# Patient Record
Sex: MALE | Race: WHITE | NOT HISPANIC OR LATINO | Employment: OTHER | URBAN - METROPOLITAN AREA
[De-identification: names, ages, dates, MRNs, and addresses within clinical notes are randomized per-mention and may not be internally consistent; named-entity substitution may affect disease eponyms.]

---

## 2022-01-01 ENCOUNTER — TELEPHONE (OUTPATIENT)
Dept: CARDIOLOGY | Facility: CLINIC | Age: 67
End: 2022-01-01

## 2022-01-01 ENCOUNTER — VIRTUAL VISIT (OUTPATIENT)
Dept: CARDIOLOGY | Facility: CLINIC | Age: 67
End: 2022-01-01
Attending: PHYSICIAN ASSISTANT
Payer: COMMERCIAL

## 2022-01-01 ENCOUNTER — TRANSCRIBE ORDERS (OUTPATIENT)
Dept: OTHER | Age: 67
End: 2022-01-01

## 2022-01-01 ENCOUNTER — TRANSFERRED RECORDS (OUTPATIENT)
Dept: HEALTH INFORMATION MANAGEMENT | Facility: CLINIC | Age: 67
End: 2022-01-01

## 2022-01-01 DIAGNOSIS — I27.20 PULMONARY HYPERTENSION, UNSPECIFIED (H): ICD-10-CM

## 2022-01-01 DIAGNOSIS — I27.20 PULMONARY HYPERTENSION, UNSPECIFIED (H): Primary | ICD-10-CM

## 2022-01-01 DIAGNOSIS — I27.20 PULMONARY HYPERTENSION (H): ICD-10-CM

## 2022-01-01 DIAGNOSIS — R06.09 DOE (DYSPNEA ON EXERTION): Primary | ICD-10-CM

## 2022-01-01 DIAGNOSIS — I27.20 PULMONARY HYPERTENSION (H): Primary | ICD-10-CM

## 2022-01-01 DIAGNOSIS — R06.02 SOB (SHORTNESS OF BREATH): ICD-10-CM

## 2022-01-01 PROCEDURE — 99214 OFFICE O/P EST MOD 30 MIN: CPT | Mod: 95 | Performed by: PHYSICIAN ASSISTANT

## 2022-01-01 RX ORDER — TREPROSTINIL 16-32-48
64 KIT INHALATION 4 TIMES DAILY
Qty: 1 EACH | Refills: 11 | COMMUNITY
Start: 2022-01-01 | End: 2022-01-01 | Stop reason: ALTCHOICE

## 2022-01-01 RX ORDER — TREPROSTINIL 64 UG/1
64 INHALANT ORAL 4 TIMES DAILY
Refills: 11 | COMMUNITY
Start: 2022-01-01 | End: 2023-01-01

## 2022-03-02 ENCOUNTER — TRANSCRIBE ORDERS (OUTPATIENT)
Dept: OTHER | Age: 67
End: 2022-03-02

## 2022-03-02 ENCOUNTER — TELEPHONE (OUTPATIENT)
Dept: PULMONOLOGY | Facility: CLINIC | Age: 67
End: 2022-03-02

## 2022-03-02 DIAGNOSIS — J84.9 ILD (INTERSTITIAL LUNG DISEASE) (H): Primary | ICD-10-CM

## 2022-03-02 NOTE — TELEPHONE ENCOUNTER
[facility] Call Center    Phone Message    May a detailed message be left on voicemail: yes     Reason for Call: Appointment Intake    Referring Provider Name: Erin Granado APRN,CNP   [facility] Pulmonology   Diagnosis and/or Symptoms: ILD    New patient for ILD. Thank you!     Action Taken: Message routed to:  [facility] Pulmonology     Travel Screening: Not Applicable

## 2022-03-03 NOTE — TELEPHONE ENCOUNTER
"ILD New Patient Referral: Pre visit communication    Patient: Deo Wilkinson  Reason for Referral: ILD  Referring Physician: Erin SIERRA  [facility]  Chest CT scan:CTAngiogram while in the hospital February 2022, Non contrast CT  November 2020 at [facility]  Biopsy: No  PFT's:per patient \"about one year ago\" at [facility] in [place]  Additional testing:     Current symptoms:SOB, cough, Hypoxia  Current related prescriptions: Nebulizer, inhalers, on last day of prednisone taper    Supplemental oxygen? Yes 5 LPM at rest, 10 LPM with activity.  [facility]    In review of apparent records and conversation with patient; recommend first visit with ILD provider be conducted :  CT, PFT's, 6 MWT, and appt with ILD provider    Additional notes:   "

## 2022-03-04 ENCOUNTER — MEDICAL CORRESPONDENCE (OUTPATIENT)
Dept: HEALTH INFORMATION MANAGEMENT | Facility: CLINIC | Age: 67
End: 2022-03-04

## 2022-03-09 DIAGNOSIS — J84.9 ILD (INTERSTITIAL LUNG DISEASE) (H): Primary | ICD-10-CM

## 2022-03-10 DIAGNOSIS — J84.9 ILD (INTERSTITIAL LUNG DISEASE) (H): Primary | ICD-10-CM

## 2022-06-16 ENCOUNTER — ANCILLARY PROCEDURE (OUTPATIENT)
Dept: CT IMAGING | Facility: CLINIC | Age: 67
End: 2022-06-16
Attending: INTERNAL MEDICINE
Payer: COMMERCIAL

## 2022-06-16 ENCOUNTER — LAB (OUTPATIENT)
Dept: LAB | Facility: CLINIC | Age: 67
End: 2022-06-16
Payer: COMMERCIAL

## 2022-06-16 ENCOUNTER — OFFICE VISIT (OUTPATIENT)
Dept: PULMONOLOGY | Facility: CLINIC | Age: 67
End: 2022-06-16
Attending: INTERNAL MEDICINE
Payer: COMMERCIAL

## 2022-06-16 ENCOUNTER — PATIENT OUTREACH (OUTPATIENT)
Dept: PULMONOLOGY | Facility: CLINIC | Age: 67
End: 2022-06-16

## 2022-06-16 VITALS
WEIGHT: 174 LBS | HEART RATE: 78 BPM | OXYGEN SATURATION: 99 % | HEIGHT: 72 IN | DIASTOLIC BLOOD PRESSURE: 78 MMHG | SYSTOLIC BLOOD PRESSURE: 113 MMHG | BODY MASS INDEX: 23.57 KG/M2

## 2022-06-16 DIAGNOSIS — J84.10 PULMONARY FIBROSIS (H): ICD-10-CM

## 2022-06-16 DIAGNOSIS — J84.9 ILD (INTERSTITIAL LUNG DISEASE) (H): Primary | ICD-10-CM

## 2022-06-16 DIAGNOSIS — J84.9 ILD (INTERSTITIAL LUNG DISEASE) (H): ICD-10-CM

## 2022-06-16 DIAGNOSIS — J84.10 FIBROTIC LUNG DISEASES (H): Primary | ICD-10-CM

## 2022-06-16 DIAGNOSIS — I27.20 PULMONARY HYPERTENSION (H): ICD-10-CM

## 2022-06-16 LAB
6 MIN WALK (FT): 458 FT
6 MIN WALK (M): 140 M
ALBUMIN SERPL-MCNC: 3.6 G/DL (ref 3.4–5)
ALP SERPL-CCNC: 126 U/L (ref 40–150)
ALT SERPL W P-5'-P-CCNC: 30 U/L (ref 0–70)
ANION GAP SERPL CALCULATED.3IONS-SCNC: 7 MMOL/L (ref 3–14)
AST SERPL W P-5'-P-CCNC: 25 U/L (ref 0–45)
BILIRUB DIRECT SERPL-MCNC: 0.2 MG/DL (ref 0–0.2)
BILIRUB SERPL-MCNC: 0.4 MG/DL (ref 0.2–1.3)
BUN SERPL-MCNC: 15 MG/DL (ref 7–30)
CALCIUM SERPL-MCNC: 9.4 MG/DL (ref 8.5–10.1)
CHLORIDE BLD-SCNC: 108 MMOL/L (ref 94–109)
CK SERPL-CCNC: 54 U/L (ref 30–300)
CO2 SERPL-SCNC: 28 MMOL/L (ref 20–32)
CREAT SERPL-MCNC: 0.79 MG/DL (ref 0.66–1.25)
CRP SERPL-MCNC: <2.9 MG/L (ref 0–8)
ERYTHROCYTE [SEDIMENTATION RATE] IN BLOOD BY WESTERGREN METHOD: 21 MM/HR (ref 0–20)
GFR SERPL CREATININE-BSD FRML MDRD: >90 ML/MIN/1.73M2
GLUCOSE BLD-MCNC: 95 MG/DL (ref 70–99)
POTASSIUM BLD-SCNC: 4.2 MMOL/L (ref 3.4–5.3)
PROT SERPL-MCNC: 7.9 G/DL (ref 6.8–8.8)
SODIUM SERPL-SCNC: 143 MMOL/L (ref 133–144)

## 2022-06-16 PROCEDURE — 86235 NUCLEAR ANTIGEN ANTIBODY: CPT | Performed by: INTERNAL MEDICINE

## 2022-06-16 PROCEDURE — 36415 COLL VENOUS BLD VENIPUNCTURE: CPT | Performed by: PATHOLOGY

## 2022-06-16 PROCEDURE — 82550 ASSAY OF CK (CPK): CPT | Performed by: PATHOLOGY

## 2022-06-16 PROCEDURE — 82085 ASSAY OF ALDOLASE: CPT | Performed by: PATHOLOGY

## 2022-06-16 PROCEDURE — 82248 BILIRUBIN DIRECT: CPT | Performed by: PATHOLOGY

## 2022-06-16 PROCEDURE — 86036 ANCA SCREEN EACH ANTIBODY: CPT | Performed by: INTERNAL MEDICINE

## 2022-06-16 PROCEDURE — 99000 SPECIMEN HANDLING OFFICE-LAB: CPT | Performed by: PATHOLOGY

## 2022-06-16 PROCEDURE — 86140 C-REACTIVE PROTEIN: CPT | Performed by: PATHOLOGY

## 2022-06-16 PROCEDURE — 86431 RHEUMATOID FACTOR QUANT: CPT | Performed by: INTERNAL MEDICINE

## 2022-06-16 PROCEDURE — 94618 PULMONARY STRESS TESTING: CPT | Performed by: INTERNAL MEDICINE

## 2022-06-16 PROCEDURE — G0463 HOSPITAL OUTPT CLINIC VISIT: HCPCS | Mod: 25

## 2022-06-16 PROCEDURE — 94729 DIFFUSING CAPACITY: CPT | Performed by: INTERNAL MEDICINE

## 2022-06-16 PROCEDURE — 82784 ASSAY IGA/IGD/IGG/IGM EACH: CPT | Performed by: INTERNAL MEDICINE

## 2022-06-16 PROCEDURE — 80053 COMPREHEN METABOLIC PANEL: CPT | Performed by: PATHOLOGY

## 2022-06-16 PROCEDURE — 94375 RESPIRATORY FLOW VOLUME LOOP: CPT | Performed by: INTERNAL MEDICINE

## 2022-06-16 PROCEDURE — 71250 CT THORAX DX C-: CPT | Mod: GC | Performed by: RADIOLOGY

## 2022-06-16 PROCEDURE — 99205 OFFICE O/P NEW HI 60 MIN: CPT | Mod: 25 | Performed by: INTERNAL MEDICINE

## 2022-06-16 PROCEDURE — 86606 ASPERGILLUS ANTIBODY: CPT | Performed by: PATHOLOGY

## 2022-06-16 PROCEDURE — 85652 RBC SED RATE AUTOMATED: CPT | Performed by: PATHOLOGY

## 2022-06-16 PROCEDURE — 86200 CCP ANTIBODY: CPT | Performed by: INTERNAL MEDICINE

## 2022-06-16 PROCEDURE — 86331 IMMUNODIFFUSION OUCHTERLONY: CPT | Performed by: PATHOLOGY

## 2022-06-16 PROCEDURE — 94726 PLETHYSMOGRAPHY LUNG VOLUMES: CPT | Performed by: INTERNAL MEDICINE

## 2022-06-16 PROCEDURE — 86038 ANTINUCLEAR ANTIBODIES: CPT | Performed by: INTERNAL MEDICINE

## 2022-06-16 RX ORDER — ATORVASTATIN CALCIUM 20 MG/1
10 TABLET, FILM COATED ORAL DAILY
COMMUNITY
Start: 2022-03-23

## 2022-06-16 RX ORDER — FUROSEMIDE 40 MG
40 TABLET ORAL DAILY
COMMUNITY
Start: 2022-03-17 | End: 2023-01-01

## 2022-06-16 RX ORDER — ALBUTEROL SULFATE 0.83 MG/ML
2.5 SOLUTION RESPIRATORY (INHALATION) DAILY
COMMUNITY
Start: 2022-02-22

## 2022-06-16 RX ORDER — ERGOCALCIFEROL (VITAMIN D2) 10 MCG
1 TABLET ORAL DAILY
COMMUNITY
Start: 2021-06-18

## 2022-06-16 RX ORDER — VITAMIN B COMPLEX
1 CAPSULE ORAL DAILY
COMMUNITY
Start: 2021-06-18

## 2022-06-16 RX ORDER — ACETAMINOPHEN 500 MG
500-1000 TABLET ORAL
COMMUNITY

## 2022-06-16 RX ORDER — LORATADINE 10 MG/1
1 TABLET ORAL DAILY
Status: ON HOLD | COMMUNITY
Start: 2022-01-24 | End: 2023-01-01

## 2022-06-16 ASSESSMENT — PAIN SCALES - GENERAL: PAINLEVEL: NO PAIN (0)

## 2022-06-16 NOTE — PROGRESS NOTES
Starting IPF Medication    Discussed with patient regarding Dr. De La Vega prescribing OFEV.    Nursing Action/Patient Instruction: Instructed patient on how to take medication and that it must be taken with food. Reviewed common side effects and ways to help reduce side effects. Instructed patient to contact if they are having side effects, so we can help manage. Instructed patient that we will need monthly labs done for the first 3 months and then every 3 months thereafter.  Informed patient on how they will receive the drug through a Speciality Pharmacy and that a Prior Auth will be required. If it is taking to long for insurance approval the drug company will send out a free trail while they are working with the insurance company to get the drug covered. If the copay comes back too high, there is financial help through the drug company/foundations.     Patient Questions/Concerns: Patient will be getting labs done at [facility]. Patient agreed with plan and will contact us if they are have any issues.

## 2022-06-16 NOTE — PROGRESS NOTES
"[facility]  Pulmonary Medicine  Visit Clinic Note  June 16, 2022    Dear patient. Thank you for visiting with me. I want you to feel respected, understood, and empowered. \"Respect\" is valuing you as much as I would a close family member. \"Empowerment\" happens when you are fully informed, and can make the best possible decision for you.  Please ask me questions!  Challenge anything that is not clear.       ASSESSMENT & PLAN     Patient is a 67 year old old male who has been referred to pulmonary clinic for further management of CPFE/ UIP     #Pulmonary fibrosis most likely CPFE. With possible UIP patten  #Severe Hypoxemic respiratory failure   #Group III Pulmonary Hypertension   #Dyspnea on  Exertion   -Patient has had severe fibrosis which can explain  His hypoxemia and shortness of breath.   - His reduction in diffusion capacity is significantly increased compared to his spirometry numbers.  - I have referred him to cardiology for further treatment of his group 3 pulmonary hypertension.  - I also start him on ofev therapy for his pulmonary fibrosis  - Has been using vest therapy.  Normal Bicarb levels   -I have also placed a lung transplant referral as he has significant lung disease with hypoxemic respiratory failure.    #Positive SSA and mildly increased ESR  -No prednisone treatment is required on this patient.  As no groundglass opacity was noted.  Mainly lung fibrosis was noted.  - Patient has been recommended to see a rheumatology.  Patient will see rheumatology in [facility].    -His case was discussed in the ILD conference.  Assessment and plan as above.      Vaccine:   - Has had covid vaccine 3.   - Is up to date on pneumococcal vaccine.     RTC in 3 months.     72 minutes excluding the time spent on cigarette cessation was  spent on the date of the encounter doing chart review, history and exam, documentation and further activities as noted above.    These conclusions are made " at the best of one's knowledge and belief based on the provided evidence such as patient's history and allergy test results and they can change over time or can be incomplete because of missing information's.    I explained the lab values, imagings and findings to the patient.  Patient expressed understanding I did not recognize any barriers to the understanding of the patient.    The above note was dictated using voice recognition software and may include typographical errors. Please contact the author for any clarifications.    Dane De La Vega MD   RN Coordinators: Jose/Maria Dolores: [phone]  ILD RN Coordinators: [phone]  Clinic Number: [phone]  Pager: [phone]       Today's visit note:     Chief Complaint: Deo Wiklinson is a 67 year old year old male who is being seen for Interstitial Lung Disease (ILD) (New ILD CPFE)      HPI:   Patient is a 67 year old male who has been referred to pulmonary clinic for further work-up of his dyspnea on exertion.  Patient has been followed by pulmonary nurse practitioner in [facility].  He has been treated with inhalers.  He was noted to have lung fibrosis and worsening dyspnea.    -He has had 2 recent hospitalization in November as well as December and since then his shortness of breath has continued to worsen.  He was referred to pulmonary clinic here for further work-up.    - No dizziness or lightheadedness is noted.     ILD exposure questions:  Occupation: Retired. Worked in trucks , construction. Also worked n [employer]. Was exposed to dust.  2 years ago.   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   Pet bird: a dog.    Hot tub: No   Portable humidifier: Yes. Keeps it clean  Natural gas   Smoking tobacco or marijuana: 40 years. Stopepd smoking a month ago.  Half pakc per day  Hobbies: Hunting. [place].   Chemotherapy or radiation therapy: None   Fam hx of ILD: Lung cancer/ smoking.  None      Medical History:   - History of CAD. 2 stents.  2005             Medications:     Current Outpatient Medications   Medication     acetaminophen (TYLENOL) 500 MG tablet     albuterol (PROVENTIL) (2.5 MG/3ML) 0.083% neb solution     atorvastatin (LIPITOR) 20 MG tablet     FLUoxetine (PROZAC) 20 MG capsule     Fluticasone-Umeclidin-Vilanterol (TRELEGY ELLIPTA) 100-62.5-25 MCG/INH oral inhaler     furosemide (LASIX) 40 MG tablet     loratadine (CLARITIN) 10 MG tablet     MULTIPLE VITAMIN PO     study - aspirin vs placebo, IDS #[id number], 81mg-PLACEBO tablet     vitamin (B COMPLEX) capsule     Vitamin D, Cholecalciferol, 10 MCG (400 UNIT) TABS     No current facility-administered medications for this visit.            Review of Systems:       A complete 10 point review of systems was otherwise negative except as noted in the HPI.        PHYSICAL EXAM:  Ht 1.829 m (6')   Wt 78.9 kg (174 lb)   BMI 23.60 kg/m       General: Well developed, well nourished, No apparent distress  Eyes: Anicteric  Nose: Nasal mucosa with no edema or hyperemia.  No polyps  Ears: Hearing grossly normal  Mouth: Oral mucosa is moist, without any lesions. No oropharyngeal exudate.  Respiratory: Poor air entry is noted.  Cardiac: RRR, normal S1, S2. No murmurs. No JVD  Abdomen: Soft, NT/ND  Musculoskeletal: Extremities normal. No clubbing. No cyanosis. No edema.  Skin: No rash on limited exam  Neuro: Normal mentation. Normal speech.  Psych:Normal affect           Data:   All laboratory and imaging data reviewed.      PFT:     6/2022            PFT Interpretation:  I personally reviewed and interpreted the PFTs.    ECHo: 3/2022  The estimated ejection fraction is 55-60%.     * Left ventricular segmental wall motion is normal.     * The right ventricle is mildly enlarged.     * Mildly reduced right ventricular systolic function.     * The left atrium is normal in size.     * There is no aortic stenosis with a peak velocity of  96 cm/s.     * There is moderate tricuspid regurgitation.     * Moderate to severe pulmonary  hypertension, estimated pulmonary arterial   systolic pressure is  55 mmHg.     * The IVC is normal in size (< 2.1 cm), > 50% respiratory variance, RA   pressure normal at 3 mmHg.     * There is no pericardial effusion visualized.       CXR: I personally reviewed and interpreted the chest x-ray    Chest CT: I personally reviewed and interpreted the CT scan.    2020 6/2022        Recent Results (from the past 168 hour(s))   6 minute walk test    Collection Time: 06/16/22 12:00 AM   Result Value Ref Range    6 min walk (FT) 458 ft    6 Min Walk (M) 140 m   General PFT Lab (Please always keep checked)    Collection Time: 06/16/22 12:05 PM   Result Value Ref Range    FVC-Pred 4.76 L    FVC-Pre 4.36 L    FVC-%Pred-Pre 91 %    FEV1-Pre 3.49 L    FEV1-%Pred-Pre 96 %    FEV1FVC-Pred 76 %    FEV1FVC-Pre 80 %    FEFMax-Pred 9.28 L/sec    FEFMax-Pre 10.19 L/sec    FEFMax-%Pred-Pre 109 %    FEF2575-Pred 2.84 L/sec    FEF2575-Pre 3.10 L/sec    XYW8073-%Pred-Pre 109 %    ExpTime-Pre 7.38 sec    FIFMax-Pre 4.80 L/sec    VC-Pred 5.23 L    VC-Pre 4.43 L    VC-%Pred-Pre 84 %    IC-Pred 3.72 L    IC-Pre 2.59 L    IC-%Pred-Pre 69 %    ERV-Pred 1.51 L    ERV-Pre 1.84 L    ERV-%Pred-Pre 122 %    FEV1FEV6-Pred 78 %    FEV1FEV6-Pre 80 %    FRCPleth-Pred 3.77 L    FRCPleth-Pre 3.62 L    FRCPleth-%Pred-Pre 95 %    RVPleth-Pred 2.60 L    RVPleth-Pre 1.78 L    RVPleth-%Pred-Pre 68 %    TLCPleth-Pred 7.53 L    TLCPleth-Pre 6.21 L    TLCPleth-%Pred-Pre 82 %    DLCOunc-Pred 28.44 ml/min/mmHg    DLCOunc-Pre 6.96 ml/min/mmHg    DLCOunc-%Pred-Pre 24 %    VA-Pre 6.02 L    VA-%Pred-Pre 86 %    FEV1SVC-Pred 69 %    FEV1SVC-Pre 79 %

## 2022-06-16 NOTE — NURSING NOTE
Chief Complaint   Patient presents with     Interstitial Lung Disease (ILD)     New ILD CPFE     Vitals were taken and medications were reconciled.     Belkis Ricardo RMA  2:03 PM

## 2022-06-16 NOTE — LETTER
"    6/16/2022         RE: Deo Wilkinson  [address]  [facility] [address]        Dear Colleague,    Thank you for referring your patient, Deo Wilkinson, to the [facility] FOR LUNG SCIENCE AND [facility]. Please see a copy of my visit note below.    [facility]  Pulmonary Medicine  Visit Clinic Note  June 16, 2022    Dear patient. Thank you for visiting with me. I want you to feel respected, understood, and empowered. \"Respect\" is valuing you as much as I would a close family member. \"Empowerment\" happens when you are fully informed, and can make the best possible decision for you.  Please ask me questions!  Challenge anything that is not clear.       ASSESSMENT & PLAN     Patient is a 67 year old old male who has been referred to pulmonary clinic for further management of CPFE/ UIP     #Pulmonary fibrosis most likely CPFE. With possible UIP patten  #Severe Hypoxemic respiratory failure   #Group III Pulmonary Hypertension   #Dyspnea on  Exertion   -Patient has had severe fibrosis which can explain  His hypoxemia and shortness of breath.   - His reduction in diffusion capacity is significantly increased compared to his spirometry numbers.  - I have referred him to cardiology for further treatment of his group 3 pulmonary hypertension.  - I also start him on ofev therapy for his pulmonary fibrosis  - Has been using vest therapy.  Normal Bicarb levels   -I have also placed a lung transplant referral as he has significant lung disease with hypoxemic respiratory failure.    #Positive SSA and mildly increased ESR  -No prednisone treatment is required on this patient.  As no groundglass opacity was noted.  Mainly lung fibrosis was noted.  - Patient has been recommended to see a rheumatology.  Patient will see rheumatology in [facility].    -His case was discussed in the ILD conference.  Assessment and plan as above.      Vaccine:   - Has had covid vaccine 3. "   - Is up to date on pneumococcal vaccine.     RTC in 3 months.     72 minutes excluding the time spent on cigarette cessation was  spent on the date of the encounter doing chart review, history and exam, documentation and further activities as noted above.    These conclusions are made at the best of one's knowledge and belief based on the provided evidence such as patient's history and allergy test results and they can change over time or can be incomplete because of missing information's.    I explained the lab values, imagings and findings to the patient.  Patient expressed understanding I did not recognize any barriers to the understanding of the patient.    The above note was dictated using voice recognition software and may include typographical errors. Please contact the author for any clarifications.    Dane De La Vega MD   RN Coordinators: Baltazar/Kennedy/Maria Dolores: [phone]  ILD RN Coordinators: [phone]  Clinic Number: [phone]  Pager: [phone]       Today's visit note:     Chief Complaint: Deo Wilkinson is a 67 year old year old male who is being seen for Interstitial Lung Disease (ILD) (New ILD CPFE)      HPI:   Patient is a 67 year old male who has been referred to pulmonary clinic for further work-up of his dyspnea on exertion.  Patient has been followed by pulmonary nurse practitioner in [facility].  He has been treated with inhalers.  He was noted to have lung fibrosis and worsening dyspnea.    -He has had 2 recent hospitalization in November as well as December and since then his shortness of breath has continued to worsen.  He was referred to pulmonary clinic here for further work-up.    - No dizziness or lightheadedness is noted.     ILD exposure questions:  Occupation: Retired. 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 Was exposed to dust.  2 years ago.   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   Pet bird: a dog.    Hot tub: No   Portable humidifier: Yes. Keeps it clean   Natural gas   Smoking tobacco or marijuana: 40 years. Stopepd smoking a month ago.  Half pakc per day  Hobbies: Hunting. [place].   Chemotherapy or radiation therapy: None   Fam hx of ILD: Lung cancer/ smoking.  None      Medical History:   - History of CAD. 2 stents.  2005            Medications:     Current Outpatient Medications   Medication     acetaminophen (TYLENOL) 500 MG tablet     albuterol (PROVENTIL) (2.5 MG/3ML) 0.083% neb solution     atorvastatin (LIPITOR) 20 MG tablet     FLUoxetine (PROZAC) 20 MG capsule     Fluticasone-Umeclidin-Vilanterol (TRELEGY ELLIPTA) 100-62.5-25 MCG/INH oral inhaler     furosemide (LASIX) 40 MG tablet     loratadine (CLARITIN) 10 MG tablet     MULTIPLE VITAMIN PO     study - aspirin vs placebo, IDS #[id number], 81mg-PLACEBO tablet     vitamin (B COMPLEX) capsule     Vitamin D, Cholecalciferol, 10 MCG (400 UNIT) TABS     No current facility-administered medications for this visit.            Review of Systems:       A complete 10 point review of systems was otherwise negative except as noted in the HPI.        PHYSICAL EXAM:  Ht 1.829 m (6')   Wt 78.9 kg (174 lb)   BMI 23.60 kg/m       General: Well developed, well nourished, No apparent distress  Eyes: Anicteric  Nose: Nasal mucosa with no edema or hyperemia.  No polyps  Ears: Hearing grossly normal  Mouth: Oral mucosa is moist, without any lesions. No oropharyngeal exudate.  Respiratory: Poor air entry is noted.  Cardiac: RRR, normal S1, S2. No murmurs. No JVD  Abdomen: Soft, NT/ND  Musculoskeletal: Extremities normal. No clubbing. No cyanosis. No edema.  Skin: No rash on limited exam  Neuro: Normal mentation. Normal speech.  Psych:Normal affect           Data:   All laboratory and imaging data reviewed.      PFT:     6/2022            PFT Interpretation:  I personally reviewed and interpreted the PFTs.    ECHo: 3/2022  The estimated ejection fraction is 55-60%.     * Left ventricular segmental wall motion is normal.     * The  right ventricle is mildly enlarged.     * Mildly reduced right ventricular systolic function.     * The left atrium is normal in size.     * There is no aortic stenosis with a peak velocity of  96 cm/s.     * There is moderate tricuspid regurgitation.     * Moderate to severe pulmonary hypertension, estimated pulmonary arterial   systolic pressure is  55 mmHg.     * The IVC is normal in size (< 2.1 cm), > 50% respiratory variance, RA   pressure normal at 3 mmHg.     * There is no pericardial effusion visualized.       CXR: I personally reviewed and interpreted the chest x-ray    Chest CT: I personally reviewed and interpreted the CT scan.    2020 6/2022        Recent Results (from the past 168 hour(s))   6 minute walk test    Collection Time: 06/16/22 12:00 AM   Result Value Ref Range    6 min walk (FT) 458 ft    6 Min Walk (M) 140 m   General PFT Lab (Please always keep checked)    Collection Time: 06/16/22 12:05 PM   Result Value Ref Range    FVC-Pred 4.76 L    FVC-Pre 4.36 L    FVC-%Pred-Pre 91 %    FEV1-Pre 3.49 L    FEV1-%Pred-Pre 96 %    FEV1FVC-Pred 76 %    FEV1FVC-Pre 80 %    FEFMax-Pred 9.28 L/sec    FEFMax-Pre 10.19 L/sec    FEFMax-%Pred-Pre 109 %    FEF2575-Pred 2.84 L/sec    FEF2575-Pre 3.10 L/sec    JSF6498-%Pred-Pre 109 %    ExpTime-Pre 7.38 sec    FIFMax-Pre 4.80 L/sec    VC-Pred 5.23 L    VC-Pre 4.43 L    VC-%Pred-Pre 84 %    IC-Pred 3.72 L    IC-Pre 2.59 L    IC-%Pred-Pre 69 %    ERV-Pred 1.51 L    ERV-Pre 1.84 L    ERV-%Pred-Pre 122 %    FEV1FEV6-Pred 78 %    FEV1FEV6-Pre 80 %    FRCPleth-Pred 3.77 L    FRCPleth-Pre 3.62 L    FRCPleth-%Pred-Pre 95 %    RVPleth-Pred 2.60 L    RVPleth-Pre 1.78 L    RVPleth-%Pred-Pre 68 %    TLCPleth-Pred 7.53 L    TLCPleth-Pre 6.21 L    TLCPleth-%Pred-Pre 82 %    DLCOunc-Pred 28.44 ml/min/mmHg    DLCOunc-Pre 6.96 ml/min/mmHg    DLCOunc-%Pred-Pre 24 %    VA-Pre 6.02 L    VA-%Pred-Pre 86 %    FEV1SVC-Pred 69 %    FEV1SVC-Pre 79 %       Again, thank you for  allowing me to participate in the care of your patient.        Sincerely,        Dane De La Vega MD

## 2022-06-17 ENCOUNTER — TELEPHONE (OUTPATIENT)
Dept: CARDIOLOGY | Facility: CLINIC | Age: 67
End: 2022-06-17
Payer: COMMERCIAL

## 2022-06-17 ENCOUNTER — REFERRAL (OUTPATIENT)
Dept: TRANSPLANT | Facility: CLINIC | Age: 67
End: 2022-06-17

## 2022-06-17 ENCOUNTER — TELEPHONE (OUTPATIENT)
Dept: PULMONOLOGY | Facility: CLINIC | Age: 67
End: 2022-06-17

## 2022-06-17 DIAGNOSIS — J84.10 FIBROTIC LUNG DISEASES (H): Primary | ICD-10-CM

## 2022-06-17 LAB
ALDOLASE SERPL-CCNC: 4 U/L
ANA PAT SER IF-IMP: ABNORMAL
ANA SER QL IF: ABNORMAL
ANA TITR SER IF: ABNORMAL {TITER}
ANCA AB PATTERN SER IF-IMP: NORMAL
C-ANCA TITR SER IF: NORMAL {TITER}
CCP AB SER IA-ACNC: 1.4 U/ML
DLCOUNC-%PRED-PRE: 24 %
DLCOUNC-PRE: 6.96 ML/MIN/MMHG
DLCOUNC-PRED: 28.44 ML/MIN/MMHG
ENA JO1 AB SER IA-ACNC: <0.5 U/ML
ENA JO1 IGG SER-ACNC: NEGATIVE
ENA SCL70 IGG SER IA-ACNC: 0.6 U/ML
ENA SCL70 IGG SER IA-ACNC: NEGATIVE
ENA SS-A AB SER IA-ACNC: 20 U/ML
ENA SS-A AB SER IA-ACNC: POSITIVE
ENA SS-B IGG SER IA-ACNC: 15 U/ML
ENA SS-B IGG SER IA-ACNC: POSITIVE
ERV-%PRED-PRE: 122 %
ERV-PRE: 1.84 L
ERV-PRED: 1.51 L
EXPTIME-PRE: 7.38 SEC
FEF2575-%PRED-PRE: 109 %
FEF2575-PRE: 3.1 L/SEC
FEF2575-PRED: 2.84 L/SEC
FEFMAX-%PRED-PRE: 109 %
FEFMAX-PRE: 10.19 L/SEC
FEFMAX-PRED: 9.28 L/SEC
FEV1-%PRED-PRE: 96 %
FEV1-PRE: 3.49 L
FEV1FEV6-PRE: 80 %
FEV1FEV6-PRED: 78 %
FEV1FVC-PRE: 80 %
FEV1FVC-PRED: 76 %
FEV1SVC-PRE: 79 %
FEV1SVC-PRED: 69 %
FIFMAX-PRE: 4.8 L/SEC
FRCPLETH-%PRED-PRE: 95 %
FRCPLETH-PRE: 3.62 L
FRCPLETH-PRED: 3.77 L
FVC-%PRED-PRE: 91 %
FVC-PRE: 4.36 L
FVC-PRED: 4.76 L
IC-%PRED-PRE: 69 %
IC-PRE: 2.59 L
IC-PRED: 3.72 L
IGG SERPL-MCNC: 1325 MG/DL (ref 610–1616)
IGG1 SER-MCNC: 782 MG/DL (ref 382–929)
IGG2 SER-MCNC: 317 MG/DL (ref 242–700)
IGG3 SER-MCNC: 79 MG/DL (ref 22–176)
IGG4 SER-MCNC: 75 MG/DL (ref 4–86)
RHEUMATOID FACT SER NEPH-ACNC: <6 IU/ML
RVPLETH-%PRED-PRE: 68 %
RVPLETH-PRE: 1.78 L
RVPLETH-PRED: 2.6 L
SUBCLASSES, PERCENT: 95 %
TLCPLETH-%PRED-PRE: 82 %
TLCPLETH-PRE: 6.21 L
TLCPLETH-PRED: 7.53 L
VA-%PRED-PRE: 86 %
VA-PRE: 6.02 L
VC-%PRED-PRE: 84 %
VC-PRE: 4.43 L
VC-PRED: 5.23 L

## 2022-06-17 NOTE — TELEPHONE ENCOUNTER
PA Initiation    Medication: Ofev - PA pending ([facility] filling[facility]insurance)   Insurance Company: Other (see comments)Comment:  [facility]  Pharmacy Filling the Rx: [facility] [place], [address]  Filling Pharmacy Phone:    Filling Pharmacy Fax:    Start Date: 6/17/2022    Released Ofev rx to [facility] [place], [place] - [address] [facility], need to fax chart notes once completed to [facility] at [phone]

## 2022-06-17 NOTE — LETTER
Deo Wilkinson  [address] [facility] [address]                June 21, 2022                                                                       MEDICAL RECORDS REQUEST      [facility] lung transplant team is requesting medical records from Primary Care Providers office for patients referred to the Lung Transplant Program                Facility: [facility]        Records Needed to Process Intake of Patient:      History and Physical (most recent physical exam note)    Provider office notes (last 3 notes on file)    Pulmonary Function Testing Results (last 3 reports)    Immunization Report (most recent)    Hospital Discharge Summary (last 2 years on file)      Chest CT Reports (all within 24 months)    Chest Xray Reports (all within 24 months)    Dexa Scan Results (most recent on file)    Echocardiogram Result (most recent on file)    Radiology Reports not including chest xray (last 2 years on file)      Colonoscopy Results and Pathology Report (if done)    Lab Results (most recent on file)    Culture Results (last 2 years on file)  PSA Lab Results (most recent on file)                    Please fax all paper records to [phone] within 3-5 business days.      [facility]  Solid Organ Transplant Office  [address] [place] [id number]    Please call our office at [phone] if you have any questions or concerns.

## 2022-06-17 NOTE — LETTER
June 21, 2022      Deo Wilkinson  [address] [facility] [address]          Dear Edie,    Thank you for your interest in the Transplant Center at [facility]. We look forward to being a part of your care team and assisting you through the transplant process.    As we discussed, your transplant coordinator is Barb Felix (Lung).  You may call your coordinator at any time with questions or concerns.  Your first scheduled call will be on June 23, 2022 between 10:00 AM and 12:00 PM.  If this needs to change, call [phone].    Please complete the following.    1. Fill out and return the enclosed forms    Authorization for Electronic Communication    Authorization to Discuss Protected Health Information    Authorization for Release of Protected Health Information    Authorization for Care Everywhere Release of Information    2. Sign up for:    [employer], access to your electronic medical record (see enclosed pamphlet)    [email or web address], a transplant education website    You can use these tools to learn more about your transplant, communicate with your care team, and track your medical details      Sincerely,  Solid Organ Transplant  [facility]    cc: Referring Physician PCP Care Team

## 2022-06-17 NOTE — TELEPHONE ENCOUNTER
Update spouse available spot on August 8th at 8AM. This was too early as they drive a distance. Update we would call for any cancellations sooner to the appointment if there are any openings.     Angie Peters RN on 6/17/2022 at 11:05 AM      -------------------------------------  [facility] Call Center    Phone Message    May a detailed message be left on voicemail: yes     Reason for Call: Appointment Intake    Referring Provider Name: Dr De La Vega  Diagnosis and/or Symptoms: Pulmonary Hypertension    Referred to Dr Dubois     Patient scheduled for 8/22 / on waitlist / wife would like him seen sooner if possible preferably on a Monday.     Review order and schedule earlier if needed.    Action Taken: Other: Cardiology    Travel Screening: Not Applicable

## 2022-06-20 ENCOUNTER — TEAM CONFERENCE (OUTPATIENT)
Dept: PULMONOLOGY | Facility: CLINIC | Age: 67
End: 2022-06-20
Payer: COMMERCIAL

## 2022-06-21 VITALS — HEIGHT: 72 IN | WEIGHT: 174 LBS | BODY MASS INDEX: 23.57 KG/M2

## 2022-06-21 NOTE — TELEPHONE ENCOUNTER
SOT LUNG INTAKE JAMES      Referring Provider: [facility]  [facility]  Specialist:Pulmonologist: Erin Granado NP-Jayme  [facility]  Diagnosis:ILD  Source/Facility: [facility]    Smoking/nicotine use history: quit smoking 11/2021, 20 pack years  Alcohol use history: 5-8 beers on the weekend  Drug use history: never  Cancer history:  no  Cardiac history:  MI, stents (2) 2005  BMI:23.6  O2 @ 4-8 LPM     Notes: Due to COVID, verbal consent received for Care Everywhere Authorization from the patient during this call.    Is patient in a group home/assisted living? no  Does patient have a guardian? no    Referral intake process completed.  Patient is aware that after financial approval is received, medical records will be requested.   Patient confirmed for a callback from transplant coordinator on June 23, 2022. (within 2 weeks)  Tentative evaluation date TBD. (within 4 weeks)    Confirmed coordinator will discuss evaluation process in more detail at the time of their call.   Patient is aware of the need to arrange age appropriate cancer screening, vaccinations, and dental care.  Reminded patient to complete questionnaire, complete medical records release, and review packet prior to evaluation visit .  Assessed patient for special needs (ie--wheelchair, assistance, guardian, and ):  wheelchair for long distances   Patient instructed to call [phone] with questions.

## 2022-06-22 LAB
A FLAVUS AB SER QL ID: ABNORMAL
A FUMIGATUS1 AB SER QL ID: DETECTED
A FUMIGATUS2 AB SER QL ID: DETECTED
A FUMIGATUS3 AB SER QL ID: DETECTED
A FUMIGATUS6 AB SER QL ID: ABNORMAL
A PULLULANS AB SER QL ID: ABNORMAL
PIGEON SERUM AB QL ID: ABNORMAL
S RECTIVIRGULA AB SER QL ID: ABNORMAL
S VIRIDIS AB SER QL ID: ABNORMAL
T CANDIDUS AB SER QL: ABNORMAL
T VULGARIS1 AB SER QL ID: ABNORMAL

## 2022-06-22 NOTE — TELEPHONE ENCOUNTER
ILD Conference      Patient Name: Deo Wilkinson    Reason for conference discussion/Specific Question:  Patient with CPFE.  ESR 21 and SSA positive.  Should patient be treated with prednisone in addition to OFEV given lab results?    Referring Physician:      Radiology Interpretation: CPFE, very fibrotic, no ground glass opacities or evidence of inflammation.     Pathology Interpretation:       There was a consensus recommendation for the following actions:   Patient started on OFEV. Refer to PH clinic.    Pulmonary/ILD Provider: Dr. Dane De La Vega

## 2022-06-23 NOTE — TELEPHONE ENCOUNTER
SOT LUNG INTAKE    2022    Deo Wilkinson  [id number]  Referring Provider: Dane De La Vega MD  Primary Provider: Eli Perkins [facility]  Specialist:Pulmonologist: Erin Granado NP-[facility]  Cardiology: Isael Vasquez MD - [facility]  Source/Facility: [facility]  Referral packet and welcome letter received? resend     Diagnosis: ILD  67 year old    Height: 6'  Weight: 174 lb  BMI: 23.60    Social History:    Social History     Socioeconomic History    Marital status:      Spouse name: Not on file    Number of children: Not on file    Years of education: Not on file    Highest education level: Not on file   Occupational History    Not on file   Tobacco Use    Smoking status: Some Days     Packs/day: 0.50     Years: 40.00     Pack years: 20.00     Types: Cigarettes     Last attempt to quit: 2021     Years since quittin.7    Smokeless tobacco: Never   Vaping Use    Vaping Use: Never used   Substance and Sexual Activity    Alcohol use: Yes     Comment: 5-8 beers on weekends    Drug use: Never    Sexual activity: Not on file   Other Topics Concern    Not on file   Social History Narrative    Not on file     Social Determinants of Health     Financial Resource Strain: Not on file   Food Insecurity: Not on file   Transportation Needs: Not on file   Physical Activity: Not on file   Stress: Not on file   Social Connections: Not on file   Intimate Partner Violence: Not on file   Housing Stability: Not on file       Smoking History: 15-16, 0.5 pack/day, since last November 1-2 per day, last puff of a cigarette was 2 weeks ago   Quit Date: 2022    Tobacco Use: no nicotine replacements, no vapes or e-cigs   Quit date: n/a    Street Drug Use: denies current/past use   Quit Date: n/a    ETOH Use: on the weekends, 12 beers each day but not every weekend day. DUI early 80s, no treatment   Quit Date: n/a     Second-hand Smoke exposure: none, one friend smokes in garage    Family History:  Dad:  Lung cancer, was a smoker. No other medical conditions.   Mom: T2DM, passed at 83, had pacemaker later in life, COPD, smoker  Siblings: 6 living, 1 passed (sister), no idea re: medical issues. 4 sisters, 3 brothers.  Children: 3 children - all girls. High blood pressure in oldest.     Past Medical History  Pulmonary Manifestations date: 4-5 years ago   Details: running out of air, working and day to day. Running printing machine, labor intensive. Didn't see a doctor until a year afterwards. Saw PCP first. Started on prn albuterol inhaler, sent to pulmonary (Dr. Jhaveri). Tried several different daily inhalers, landed on Trelegy that has worked well. Getting worse, more short of breath with day to day activity, 2021 November got pneumonia, got onto oxygen - first pulsed. In late Dec got influenza A, hospitalized, was not getting energy back. Mid Feb got pneumonia again, hospitalized switched to continuous oxygen, referred down to [facility]. Emphysema.    Diabetes: no  Coronary Artery Disease: TIFFANY x2 in 2005, no chest pains since  Hypertension: previously on low dose of eramapril, good BPs  Previous transfusion(s): no  History of Cancer: no  GERD: no   Bleeding/Clotting/HIT Disorders: no  GI/ history: H. pylori, cleared up with abx. Diarrhea, worse with furosemide. No  issues.    Other Past Medical History:   Rib fractures [id number] (fall)    Past Medical History:   Diagnosis Date    CAD (coronary artery disease)     Emphysema lung (H)     IPF (idiopathic pulmonary fibrosis) (H)     Pulmonary hypertension (H)        Surgical History   Lung Biopsy: none  Pneumothorax: none  Chest Surgery: none  Back surgeries - [facility], [facility]  Elbow, ankle, wrist    Past Surgical History:   Procedure Laterality Date    BACK SURGERY      CARDIAC SURGERY      COLONOSCOPY      CV RIGHT HEART CATH MEASUREMENTS RECORDED N/A 9/12/2022    Procedure: Heart Cath Right Heart Cath;  Surgeon: Galindo Sullivan MD;   Location:  HEART CARDIAC CATH LAB    CV RIGHT HEART CATH MEASUREMENTS RECORDED N/A 7/12/2023    Procedure: Heart Cath Right Heart Cath;  Surgeon: Veronica Martin MD;  Location:  HEART CARDIAC CATH LAB    CV RIGHT HEART CATH PULMONARY VASODILATOR STUDY N/A 9/12/2022    Procedure: Right Heart Cath Pulmonary Vasodilator Study;  Surgeon: Galindo Sullivan MD;  Location:  HEART CARDIAC CATH LAB    GI SURGERY      ORTHOPEDIC SURGERY         Mental Health History  Depression: denies   Anxiety: denies  Other: denies, no MH hospitalizations    Medications  Current Outpatient Medications   Medication    acetaminophen (TYLENOL) 500 MG tablet    albuterol (PROVENTIL) (2.5 MG/3ML) 0.083% neb solution    aspirin (ASA) 81 MG chewable tablet    atorvastatin (LIPITOR) 20 MG tablet    cetirizine (ZYRTEC) 10 MG tablet    digoxin (LANOXIN) 250 MCG tablet    FLUoxetine (PROZAC) 20 MG capsule    Fluticasone-Umeclidin-Vilanterol (TRELEGY ELLIPTA) 100-62.5-25 MCG/INH oral inhaler    furosemide (LASIX) 40 MG tablet    morphine 10 MG/5ML solution    MULTIPLE VITAMIN PO    nintedanib (OFEV) 100 MG capsule    Treprostinil (TYVASO DPI MAINTENANCE KIT) 64 MCG POWD    vitamin (B COMPLEX) capsule    Vitamin D, Cholecalciferol, 10 MCG (400 UNIT) TABS     No current facility-administered medications for this visit.     Blood thinner hx: none  Prednisone hx: was on this initially following diagnosis, then with each pneumonia, no daily dosing - last taken in Feb for pneumonia  Antibiotic hx: with each illness, was on preventive after 2nd hospitalization, has since stopped taking this.  Narcotic hx: none, no back pain    Allergies  Penicillins      Pulmonary Tests and Status  PFT's   Latest Reference Range & Units 06/16/22 12:05   FVC-Pre L 4.36   FVC-%Pred-Pre % 91   FEV1-Pre L 3.49   FEV1-%Pred-Pre % 96   TLCPleth-Pred L 7.53   TLCPleth-Pre L 6.21   TLCPleth-%Pred-Pre % 82   DLCOunc-Pre ml/min/mmHg 6.96   DLCOunc-%Pred-Pre %  24       ABG's  21, [facility]:  pH, Arterial 7.529 High       PCO2, Arterial 26 Low       PO2, Arterial 54 Low       Bicarbonate, Arterial 22   Base Excess, Arterial -1   Oxygen saturation, Arterial 92 Low           6 Minute Walk:    Latest Reference Range & Units 22 00:00   6 min walk (FT) ft 458       Oxygen use   At rest: 4   Sleep: 4   With Activity: 8   Date of O2 initiation:     Oxygen Company: [employer] [facility]   Contact name/number:     Sleep Study: yes - believes this was within the last year    BiPAP/CPAP: no    Pulmonary Rehab: yes   Date: spring 2022 Location: [facility]      Current activity:     Current activity:  Basic ADLs    Feeding: slowed down on eating, good appetite, eats one big meal per day and then a light breakfast. Weight has been steady for past year. No supplements needed. No swallow issues.  Toileting:   Selecting proper attire:   Grooming:   Maintaining continence:   Putting on clothing: slows down as needed.   Bathing: slows down, wearing O2, uses shower chair  Walking & Transferrin min walking before needs to catch breath    Instrumental ADLs    Managing Finances: independent  Handling Transportation: independent  Shopping: doesn't do shopping anymore  Preparing meals: wife does most of this  Using telephone & communication devices: independent  Managing medications: independent  Housework & basic home maintenance: wife manages this, patient uncertain what would/would not be doable at this time      Hospitalizations in prior 12 months  Date:  22-22  Location: [facility]  Reason: Acute respiratory failure  Date:  21-21  Location: [facility] Reason: Pneumonia        Diagnostic Tests/Imaging  Heart cath: 05, [facility]:   CORONARY ANGIOGRAPHY    1) LEFT MAIN: The left main coronary artery is normal.    2) LAD: The left anterior descending coronary artery has a complex mid vessel stenosis. There is a  stenosis of 85-90% preceded by an angulated stenosis of 60-70%. There is LEANNA II flow to the distal vessel.    3) CIRCUMFLEX: The left circumflex is of moderate size with some mild scattered irregularities.    4) RCA: The right coronary artery is a dominant vessel without    significant obstructive disease.    5) Following angioplasty and intracoronary stenting there is no residual stenosis in the mid circumflex with LEANNA III flow.    CONCLUSION    1) Normal size left ventricle with small apical wall motion abnormality consistent with small apical infarction.    2) Single-vessel coronary artery disease with 70% and 90% serial mid vessel stenoses.    3) Successful drug-eluting stenting of mid LAD with reduction of 70% and 90% serial stenoses to 0 residual stenosis with LEANNA III flow.       Stress Test: 2/28/17, [facility]:  FINAL IMPRESSION:   1.  Pharmacologic myocardial perfusion test demonstrating possible very small inferoapical fixed defect suggestive of possibly due to artifact or very small inferoapical infarct.  No evidence of ischemia on this study.    2.  Gated wall motion demonstrates ejection fraction estimated at 51% with no regional wall motion abnormalities.       ECHO: 2/21/22, [place]:  Summary:   * The estimated ejection fraction is 55-60%.   * Left ventricular segmental wall motion is normal.   * The right ventricle is mildly enlarged.   * Mildly reduced right ventricular systolic function.   * The left atrium is normal in size.   * There is no aortic stenosis with a peak velocity of  96 cm/s.   * There is moderate tricuspid regurgitation.   * Moderate to severe pulmonary hypertension, estimated pulmonary arterial systolic pressure is  55 mmHg.   * The IVC is normal in size (< 2.1 cm), > 50% respiratory variance, RA pressure normal at 3 mmHg.   * There is no pericardial effusion visualized.     Chest CT: 6/16/22, [facility]:  IMPRESSION:  1. Combined pulmonary fibrosis and emphysema (CPFE).  2.  "Decreased size of a pleural-based nodule along the lateral left  upper lobe.  3. Hyperdense exophytic cystic lesion arising from the left kidney  likely represents a proteinaceous cyst. May consider dedicated renal  ultrasound or multiphasic CT with contrast to further evaluate.    DEXA: no  Upper GI: patient believes to have had some type of upper GI imaging \"many years ago,\" CE indicates barium esophagram 1988 (report no longer available)    Primary Care  Health Maintenance   Topic Date Due    NICOTINE/TOBACCO CESSATION COUNSELING Q 1 YR  Never done    COPD ACTION PLAN  Never done    COLORECTAL CANCER SCREENING  Never done    LIPID  Never done    ZOSTER IMMUNIZATION (1 of 2) Never done    MEDICARE ANNUAL WELLNESS VISIT  03/14/2022    FALL RISK ASSESSMENT  Never done    AORTIC ANEURYSM SCREENING (SYSTEM ASSIGNED)  Never done    Pneumococcal Vaccine: 65+ Years (2 - PCV) 03/23/2023    LUNG CANCER SCREENING  09/08/2023    INFLUENZA VACCINE (1) 09/01/2023    DTAP/TDAP/TD IMMUNIZATION (4 - Td or Tdap) 02/26/2028    ADVANCE CARE PLANNING  03/07/2028    SPIROMETRY  Completed    HEPATITIS C SCREENING  Completed    PHQ-2 (once per calendar year)  Completed    COVID-19 Vaccine  Completed    IPV IMMUNIZATION  Aged Out    MENINGITIS IMMUNIZATION  Aged Out     PSA: due, last 3/18/19 at [facility]: 1.49  Colonoscopy: due now, wanted to speak to transplant clinic prior to proceeding in case alternative would be sufficient  Dental: up to date, appt coming in July, [place]; no outstanding work  Hepatitis A/B: not found  Pneumovax: PPSV23 3/23/22    Labs:  Kidney function: Reviewed in CE and results tab, no historical AKIs, most recent 6/16/22: Cr 0.79, eGFR >90, BUN 15.  Liver function: In CE noted intermittently elevated alk phos. Most recent 6/16/22: Alk phos 126, ALT 30, AST 25, T bili 0.2.  A1AT: level 176, 1/25/17, [facility]  Rheumatology:    Latest Reference Range & Units 06/16/22 15:50   Aspergillus Fumagatis 1 Antibody " None Detected  Detected !   Aspergillus Fumagatis 6 Antibody None Detected  None Detected   Aureo Pullulans None Detected  None Detected   [place] serum None Detected  None Detected   Micropolyspora Faeni None Detected  None Detected   Sed Rate 0 - 20 mm/hr 21 (H)   ANTI NUCLEAR BRIGIDO IGG BY IFA WITH REFLEX  Borderline Positive Abnormal   Speckled   1:160    HYPERSENSITIVITY PNEUMONITIS 2  Rpt !  (See below)    - 1,616 mg/dL 1,325   IgG1 382 - 929 mg/dL 782   IgG2 242 - 700 mg/dL 317   IgG3 22 - 176 mg/dL 79   IgG4 4 - 86 mg/dL 75   Nneka 1 Antibody IgG Negative  Negative   Neutrophil Cytoplasmic Antibody <1:10  <1:10   Neutrophil Cytoplasmic Antibody Pattern  The ANCA IFA is <1:10.  No further testing will be performed.   Scleroderma Antibody Scl-70 MELECIO IgG Negative  Negative     HYPERSENSITIVITY PNEUMONITIS 2 Detailed Report:  Aspergillus flavus Ab None Detected   A fumigatus #2 Ab Detected Abnormal    A fumigatus #3 Ab Detected Abnormal    Saccharo viridis Ab None Detected   Thermo candidus Ab None Detected     Cystic Fibrosis: no  Cultures: no positive cultures noted        Psycho-Social Assessment  Spouse/Significant Other/Partner: wife, Bryanna   Location:    Distance from [facility]:   Support System: no others identified at this time   Location:    Distance from [facility]:     Employment Status: retired  (Full-time, part-time, disabled, etc.)  Occupation: worked at a foundry for 22 years  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 Patient expressed he does not think he is  interested in transplant but is not sure at this time. He will reach out with questions and understands he should call at 4 months nicotine-free to schedule NPT if interested. Agreeable to future check-in from coordinator. Reviewed transplant process, caregiver requirements, substance use policy.

## 2022-06-24 ENCOUNTER — TELEPHONE (OUTPATIENT)
Dept: MULTI SPECIALTY CLINIC | Facility: CLINIC | Age: 67
End: 2022-06-24

## 2022-06-29 NOTE — TELEPHONE ENCOUNTER
Called [facility] ([phone]) and spoke to Eli, they received Ofev prescription and it is non-formulary. [facility]'s [facility] nurse is assigned but she did not see chart notes uploaded yet from fax yesterday. Sent care team an update. Verified [phone] is correct fax number for chart notes. She stated [facility] MD nurse would be contacting clinic staff with updates.

## 2022-07-06 NOTE — TELEPHONE ENCOUNTER
Called and spoke to [facility] and was told that this was approved today.  Set reminder for 07/13/22 to call and make sure Deo has received the Ofev.

## 2022-07-13 ENCOUNTER — PATIENT OUTREACH (OUTPATIENT)
Dept: PULMONOLOGY | Facility: CLINIC | Age: 67
End: 2022-07-13

## 2022-07-13 NOTE — PROGRESS NOTES
Contacted patient to see if he has [facility] the [facility].  Patient states that he received OFEV this morning and plans to start taking it tomorrow.  Reminded patient that he will need LFT's monthly for the first three months and then every three months.  Or[facility] to [facility], Dr. Eli Perkins, per patient request.  Patient instructed to call if he experiences side effects from OFEV.  Patient and his wife appreciative of call and verbalize understanding.

## 2022-07-24 ENCOUNTER — HEALTH MAINTENANCE LETTER (OUTPATIENT)
Age: 67
End: 2022-07-24

## 2022-08-04 ENCOUNTER — TRANSFERRED RECORDS (OUTPATIENT)
Dept: HEALTH INFORMATION MANAGEMENT | Facility: CLINIC | Age: 67
End: 2022-08-04

## 2022-08-04 LAB
CREATININE (EXTERNAL): 1 MG/DL (ref 0.5–1.5)
GFR ESTIMATED (EXTERNAL): 89 ML/MIN/1.73M2
GLUCOSE (EXTERNAL): 109 MG/DL (ref 70–180)
POTASSIUM (EXTERNAL): 3.9 MMOL/L (ref 3.5–5)

## 2022-08-05 ENCOUNTER — PATIENT OUTREACH (OUTPATIENT)
Dept: PULMONOLOGY | Facility: CLINIC | Age: 67
End: 2022-08-05

## 2022-08-05 NOTE — PROGRESS NOTES
Received patient's outside lab results. Hepatic panel looked good, Alk Phos slightly elevated at 151. Plan to continue to monitor with repeat lab in 1 month with follow up visit. Informed patient. Happy with plan.

## 2022-08-22 ENCOUNTER — OFFICE VISIT (OUTPATIENT)
Dept: CARDIOLOGY | Facility: CLINIC | Age: 67
End: 2022-08-22
Attending: INTERNAL MEDICINE
Payer: COMMERCIAL

## 2022-08-22 VITALS
SYSTOLIC BLOOD PRESSURE: 105 MMHG | DIASTOLIC BLOOD PRESSURE: 74 MMHG | HEART RATE: 88 BPM | BODY MASS INDEX: 23.4 KG/M2 | WEIGHT: 172.5 LBS | OXYGEN SATURATION: 98 %

## 2022-08-22 DIAGNOSIS — I27.20 PULMONARY HYPERTENSION (H): ICD-10-CM

## 2022-08-22 PROCEDURE — 99205 OFFICE O/P NEW HI 60 MIN: CPT | Performed by: INTERNAL MEDICINE

## 2022-08-22 PROCEDURE — 93005 ELECTROCARDIOGRAM TRACING: CPT

## 2022-08-22 PROCEDURE — G0463 HOSPITAL OUTPT CLINIC VISIT: HCPCS | Mod: 25

## 2022-08-22 RX ORDER — LIDOCAINE 40 MG/G
CREAM TOPICAL
Status: CANCELLED | OUTPATIENT
Start: 2022-08-22

## 2022-08-22 RX ORDER — ASPIRIN 81 MG/1
81 TABLET, CHEWABLE ORAL DAILY
COMMUNITY

## 2022-08-22 ASSESSMENT — PAIN SCALES - GENERAL: PAINLEVEL: NO PAIN (0)

## 2022-08-22 NOTE — LETTER
2022      RE: Deo Wilkinson  [address] [facility] [address]       Dear Colleague,     Thank you for the opportunity to participate in the care of your patient, Deo Wilkinson, at the [facility] HEART [facility]. Please see a copy of my visit note below.    Service Date: 2022    Dane De La Vega MD  [facility]  [address] [place] [address]    RE:  Deo Wilkinson  MRN:  [id number]  :  1955    Dear Dr. De La Vega:      We had the pleasure of seeing Mr. Deo Wilkinson in our Pulmonary Hypertension Clinic at the [facility].  Although you are familiar with his history, please allow me to summarize it for the purpose of our records.    Mr. Wilkinson is a very delightful 67-year-old male with a past medical history significant for combined pulmonary fibrosis and emphysema.  He was diagnosed with CPFE approximately 4-5 years ago.  He was followed by Pulmonary Medicine at [facility].  He was doing reasonably okay except for a gradual decline in exertional shortness of breath up until 2020, when he was admitted with pneumonia.  Since then, he has had a rapid downhill course with significant decrease in his exercise capacity and worsening exertional shortness of breath.  He has had 3 hospitalizations for recurrent pneumonias.  He has been on supplemental oxygen since 2021.  He is currently using 6-8 liters with exertion, 4 liters at rest, and 3 liters during sleep.  He is quite limited.  He is limited even with activities of daily living.  I would currently characterize him as functional class III.  He has had lower extremity swelling for which he was started on diuretics in the spring of this year.  He has no PND or orthopnea.  No exertional chest pain or chest pressure.  No presyncope or syncope.    He was referred to our ILD clinic at the [place].  He was seen by   Yolette in 06/2022.  He has been on antifibrotic therapy, nintedanib, for the last 1 month.  He had an echocardiogram in 02/2022 at [facility].  This showed severely elevated right ventricular systolic pressure at 55 mmHg.  His right ventricle was mild to moderately dilated with mild to moderately reduced function.  His IVC was normal.  He had no pericardial effusion.  He had moderate tricuspid regurgitation.  His left ventricle was normal in size and function.  His left atrium was normal.    Given his pulmonary hypertension in the setting of CPFE, he was referred to our clinic for further evaluation and treatment.    PAST MEDICAL HISTORY:     1.  Coronary artery disease, acute coronary syndrome, status post coronary intervention in 2004.  2.  CPFE.  4.  Recent diagnosis of pulmonary hypertension.    PAST SURGICAL HISTORY:  Low back surgery.    MEDICATIONS:    Current Outpatient Medications   Medication Sig     acetaminophen (TYLENOL) 500 MG tablet Take 500-1,000 mg by mouth     albuterol (PROVENTIL) (2.5 MG/3ML) 0.083% neb solution      aspirin (ASA) 81 MG chewable tablet Take 81 mg by mouth daily     atorvastatin (LIPITOR) 20 MG tablet Take 10 mg by mouth     FLUoxetine (PROZAC) 20 MG capsule Take 40 mg by mouth     Fluticasone-Umeclidin-Vilanterol (TRELEGY ELLIPTA) 100-62.5-25 MCG/INH oral inhaler      furosemide (LASIX) 40 MG tablet Take 40 mg by mouth     loratadine (CLARITIN) 10 MG tablet Take 1 tablet by mouth daily     MULTIPLE VITAMIN PO daily     nintedanib (OFEV) 150 MG capsule Take 1 capsule (150 mg) by mouth 2 times daily     vitamin (B COMPLEX) capsule Take 1 capsule by mouth daily     Vitamin D, Cholecalciferol, 10 MCG (400 UNIT) TABS      No current facility-administered medications for this visit.       REVIEW OF SYSTEMS:  A detailed 10-point review of systems obtained as described in history of present illness.  All other systems reviewed are negative.    PERSONAL AND SOCIAL HISTORY:  He is a smoker.   He has a 20-pack-year smoking history.  He has significantly decreased his smoking from half a pack to 1 cigarette a day.  He drinks alcohol over the weekends.  Denies using any illicit drugs.  He retired 3 years ago.  He worked in the [employer] business.  He had exposure to dust.  He is  and living with his wife.  He has 3 grown children and 11 grandkids.    FAMILY HISTORY:  No significant family history of premature coronary artery disease, sudden cardiac death or pulmonary hypertension.  His father  of lung cancer at the age of 57.    PHYSICAL EXAMINATION:  He was comfortable.  He was alert, awake, oriented x3.  He was in no apparent distress.  His blood pressure was 105/74.  Pulse rate was 88.  Respiratory rate was 16.  He was saturating 98% on 4 liters of supplemental oxygen.  He weighed 172 pounds.  He was 6 feet tall.  His BMI was 23.6.  His neck exam revealed JVD at 2 cm above manubrium sternal angle.  His carotids were 2+ bilaterally.  He had no pallor, cyanosis, or jaundice.  His carotids were 1+ bilaterally.  He had no pallor, cyanosis, or jaundice.  Cardiac auscultation revealed normal S1, normal S2.  No murmur, rub or gallop.  Auscultation of the lungs revealed decreased breath sounds bilaterally.  No wheezing or crepitation.  His abdomen was soft with normal bowel sounds, no tenderness, no rigidity, no guarding.  He had no focal neurological deficit.  His extremities showed no edema.    He has not had an EKG.    His echocardiogram from 2022 showed mild to moderately dilated RV with mild to moderately reduced RV function.  Moderate tricuspid regurgitation.  His estimated RVSP was 55 + right atrial pressure.  His IVC was normal in size.  No pericardial effusion.  Left ventricle was normal in size and function.    He had a high resolution CT scan of the chest, which showed combined pulmonary fibrosis and emphysema.  His most recent PFTs showed a FVC of 91%, FEV1 of 96%, FEV1 by FVC of 80%,  TLC of 82% and a DLCO of 24%.  He had a 6-minute walk test where he walked for 140 meters only.  He required 10 liters of oxygen to maintain a low saturation of 86%.  He had a CT pulmonary angiogram at [facility] in 02/2022 that showed no evidence of pulmonary embolism.    His antinuclear antibody and rheumatoid factor were negative.  His most recent renal function, LFTs and CBC were unremarkable.    ASSESSMENT AND PLAN:     In summary, Mr. Deo Wilkinson is a 67-year-old male with a past medical history significant for combined pulmonary fibrosis and emphysema, who was referred to us for further evaluation and management of pulmonary hypertension.    He very likely has pulmonary hypertension secondary to combined pulmonary fibrosis and emphysema.  His most recent echocardiogram showed an elevated RVSP with dilated and dysfunctional right ventricle.  As a next step, I would recommend him to complete the workup for pulmonary hypertension including repeat echocardiogram with bubble study, EKG, HIV and hepatitis serology, dual-energy CT PE protocol, and right heart catheterization with vasodilator testing.    I briefly discussed the various treatment options including pulmonary vasodilator therapy and lung transplantation.  He was recommended to consider lung transplantation by our ILD colleagues.  However, he is not interested in this.  He understands that without treatment, his prognosis is poor with a limited life expectancy of 6 months to 1 year.  With treatment, his life expectancy could be longer.  He has a clear understanding of his poor prognosis, however, still does not want to proceed with lung transplantation.    We will decide on pulmonary vasodilator therapy after he completes the workup.  He would like to get this done in early 09/2022 when he returns to see Dr. De La Vega.  I discussed with him the various pulmonary vasodilator therapy options including inhaled therapy, oral therapy and parenteral prostacyclin  therapy.  He is not very keen to start parenteral prostacyclin therapy, which I think is appropriate.  Given his CPFE, we would probably start with inhaled prostacyclin and a PDE5 inhibitor combination therapy.  He is currently euvolemic on furosemide 40 mg daily, which I have recommended him to continue.  He will continue use of supplemental oxygen at 6-8 liters with exertion and 4 liters at rest. We did not make any other changes.    He will return to see me in clinic after completing the workup.  We thank you for involving us in his care.  Please do not hesitate to call us in the interim if you have any further questions.    Total time today was 75 minutes reviewing notes, imaging, labs, patient visit, orders and documentation         Sincerely,  Veronica Martin MD   [facility] for Pulmonary Hypertension  Heart Failure, Transplant, and Mechanical Circulatory Support Cardiology   Cardiovascular Division  [facility]   [phone]        D: 2022   T: 2022   MT: elly    Name:     JOHN SCHUMACHER  MRN:      [id number]        Account:      [id number]   :      1955           Service Date: 2022       Document: [id number]

## 2022-08-22 NOTE — PROGRESS NOTES
Service Date: 2022    Dane De La Vega MD  [facility]  [address] [place] [id number]    RE:  Deo Wilkinson  MRN:  [id number]  :  1955    Dear Dr. De La Vega:      We had the pleasure of seeing Mr. Deo Wilkinson in our Pulmonary Hypertension Clinic at the [facility].  Although you are familiar with his history, please allow me to summarize it for the purpose of our records.    Mr. Wilkinson is a very delightful 67-year-old male with a past medical history significant for combined pulmonary fibrosis and emphysema.  He was diagnosed with CPFE approximately 4-5 years ago.  He was followed by Pulmonary Medicine at [facility].  He was doing reasonably okay except for a gradual decline in exertional shortness of breath up until 2020, when he was admitted with pneumonia.  Since then, he has had a rapid downhill course with significant decrease in his exercise capacity and worsening exertional shortness of breath.  He has had 3 hospitalizations for recurrent pneumonias.  He has been on supplemental oxygen since 2021.  He is currently using 6-8 liters with exertion, 4 liters at rest, and 3 liters during sleep.  He is quite limited.  He is limited even with activities of daily living.  I would currently characterize him as functional class III.  He has had lower extremity swelling for which he was started on diuretics in the spring of this year.  He has no PND or orthopnea.  No exertional chest pain or chest pressure.  No presyncope or syncope.    He was referred to our ILD clinic at the [place].  He was seen by Dr. De La Vega in 2022.  He has been on antifibrotic therapy, nintedanib, for the last 1 month.  He had an echocardiogram in 2022 at [facility].  This showed severely elevated right ventricular systolic pressure at 55 mmHg.  His right ventricle was mild to moderately dilated with mild to moderately reduced function.  His IVC was normal.  He had  no pericardial effusion.  He had moderate tricuspid regurgitation.  His left ventricle was normal in size and function.  His left atrium was normal.    Given his pulmonary hypertension in the setting of CPFE, he was referred to our clinic for further evaluation and treatment.    PAST MEDICAL HISTORY:     1.  Coronary artery disease, acute coronary syndrome, status post coronary intervention in 2004.  2.  CPFE.  4.  Recent diagnosis of pulmonary hypertension.    PAST SURGICAL HISTORY:  Low back surgery.    MEDICATIONS:    Current Outpatient Medications   Medication Sig    acetaminophen (TYLENOL) 500 MG tablet Take 500-1,000 mg by mouth    albuterol (PROVENTIL) (2.5 MG/3ML) 0.083% neb solution     aspirin (ASA) 81 MG chewable tablet Take 81 mg by mouth daily    atorvastatin (LIPITOR) 20 MG tablet Take 10 mg by mouth    FLUoxetine (PROZAC) 20 MG capsule Take 40 mg by mouth    Fluticasone-Umeclidin-Vilanterol (TRELEGY ELLIPTA) 100-62.5-25 MCG/INH oral inhaler     furosemide (LASIX) 40 MG tablet Take 40 mg by mouth    loratadine (CLARITIN) 10 MG tablet Take 1 tablet by mouth daily    MULTIPLE VITAMIN PO daily    nintedanib (OFEV) 150 MG capsule Take 1 capsule (150 mg) by mouth 2 times daily    vitamin (B COMPLEX) capsule Take 1 capsule by mouth daily    Vitamin D, Cholecalciferol, 10 MCG (400 UNIT) TABS      No current facility-administered medications for this visit.       REVIEW OF SYSTEMS:  A detailed 10-point review of systems obtained as described in history of present illness.  All other systems reviewed are negative.    PERSONAL AND SOCIAL HISTORY:  He is a smoker.  He has a 20-pack-year smoking history.  He has significantly decreased his smoking from half a pack to 1 cigarette a day.  He drinks alcohol over the weekends.  Denies using any illicit drugs.  He retired 3 years ago.  He worked in the [employer] business.  He had exposure to dust.  He is  and living with his wife.  He has 3 grown children and 11  grandkids.    FAMILY HISTORY:  No significant family history of premature coronary artery disease, sudden cardiac death or pulmonary hypertension.  His father  of lung cancer at the age of 57.    PHYSICAL EXAMINATION:  He was comfortable.  He was alert, awake, oriented x3.  He was in no apparent distress.  His blood pressure was 105/74.  Pulse rate was 88.  Respiratory rate was 16.  He was saturating 98% on 4 liters of supplemental oxygen.  He weighed 172 pounds.  He was 6 feet tall.  His BMI was 23.6.  His neck exam revealed JVD at 2 cm above manubrium sternal angle.  His carotids were 2+ bilaterally.  He had no pallor, cyanosis, or jaundice.  His carotids were 1+ bilaterally.  He had no pallor, cyanosis, or jaundice.  Cardiac auscultation revealed normal S1, normal S2.  No murmur, rub or gallop.  Auscultation of the lungs revealed decreased breath sounds bilaterally.  No wheezing or crepitation.  His abdomen was soft with normal bowel sounds, no tenderness, no rigidity, no guarding.  He had no focal neurological deficit.  His extremities showed no edema.    He has not had an EKG.    His echocardiogram from 2022 showed mild to moderately dilated RV with mild to moderately reduced RV function.  Moderate tricuspid regurgitation.  His estimated RVSP was 55 + right atrial pressure.  His IVC was normal in size.  No pericardial effusion.  Left ventricle was normal in size and function.    He had a high resolution CT scan of the chest, which showed combined pulmonary fibrosis and emphysema.  His most recent PFTs showed a FVC of 91%, FEV1 of 96%, FEV1 by FVC of 80%, TLC of 82% and a DLCO of 24%.  He had a 6-minute walk test where he walked for 140 meters only.  He required 10 liters of oxygen to maintain a low saturation of 86%.  He had a CT pulmonary angiogram at [facility] in 2022 that showed no evidence of pulmonary embolism.    His antinuclear antibody and rheumatoid factor were negative.  His most recent  renal function, LFTs and CBC were unremarkable.    ASSESSMENT AND PLAN:     In summary, Mr. Deo Wilkinson is a 67-year-old male with a past medical history significant for combined pulmonary fibrosis and emphysema, who was referred to us for further evaluation and management of pulmonary hypertension.    He very likely has pulmonary hypertension secondary to combined pulmonary fibrosis and emphysema.  His most recent echocardiogram showed an elevated RVSP with dilated and dysfunctional right ventricle.  As a next step, I would recommend him to complete the workup for pulmonary hypertension including repeat echocardiogram with bubble study, EKG, HIV and hepatitis serology, dual-energy CT PE protocol, and right heart catheterization with vasodilator testing.    I briefly discussed the various treatment options including pulmonary vasodilator therapy and lung transplantation.  He was recommended to consider lung transplantation by our ILD colleagues.  However, he is not interested in this.  He understands that without treatment, his prognosis is poor with a limited life expectancy of 6 months to 1 year.  With treatment, his life expectancy could be longer.  He has a clear understanding of his poor prognosis, however, still does not want to proceed with lung transplantation.    We will decide on pulmonary vasodilator therapy after he completes the workup.  He would like to get this done in early 09/2022 when he returns to see Dr. De La Vega.  I discussed with him the various pulmonary vasodilator therapy options including inhaled therapy, oral therapy and parenteral prostacyclin therapy.  He is not very keen to start parenteral prostacyclin therapy, which I think is appropriate.  Given his CPFE, we would probably start with inhaled prostacyclin and a PDE5 inhibitor combination therapy.  He is currently euvolemic on furosemide 40 mg daily, which I have recommended him to continue.  He will continue use of supplemental oxygen  at 6-8 liters with exertion and 4 liters at rest. We did not make any other changes.    He will return to see me in clinic after completing the workup.  We thank you for involving us in his care.  Please do not hesitate to call us in the interim if you have any further questions.    Total time today was 75 minutes reviewing notes, imaging, labs, patient visit, orders and documentation         Sincerely,  Veronica Martin MD   Center for Pulmonary Hypertension  Heart Failure, Transplant, and Mechanical Circulatory Support Cardiology   Cardiovascular Division  [facility]   [phone]        D: 2022   T: 2022   MT: elly    Name:     JOHN SCHUMACHER  MRN:      [id number]        Account:      [id number]   :      1955           Service Date: 2022       Document: [id number]

## 2022-08-22 NOTE — NURSING NOTE
Chief Complaint   Patient presents with     New Patient     New PH referral from Dr. De La Vega     Vitals were taken and medications reconciled.    James Obregon, EMT  1:47 PM

## 2022-08-22 NOTE — PATIENT INSTRUCTIONS
Medication Changes:     Follow up Appointment Information:  Echocardiogram, Dual energy CT scan- September 8th  Right heart catheterization is scheduled on September 12th 12pm check in  We will have you follow-up virtually after testing with Dr. Martin      [facility]  Address: [address] [place] [address]- [facility].   Parking:   You can either use  or there is a parking ramp/lot on [address] [place] [address]  You can also park at the clinic and use the shuttle services to the hospital. Shuttle services are at the main entrance of the clinic at [address] [address]        *Mandatory COVID Testing: We require COVID testing prior to their procedure regardless of vaccination status.   This can be completed via PCR or Rapid (at lab or at home).  If you are doing an at home test please complete 1-2 days prior.  If you are completing a PCR Lab, this can be completed no sooner than 4 days prior.       You can order at home tests from:  CovidTests.gov  Or you can call: [facility] COVID-19 Public Hotline at 1-279.167.2984      Right Heart Catheterization    A Right Heart Cath is a test that measures how well your heart is pumping blood to your body and will assess the volume and pressures in your heart.   Time: Plan for up to 3 hours for procedure preparation, testing and after care.  Location: [facility], [address] [place] [address].   Check in: Gold Waiting Area. 1st floor entrance, directly down the dhaliwal.     A physician will come and talk with you about the procedure and obtain consent.  A nurse from the Cardiac Catheterization Lab will then escort you to the procedure area. You will receive a shot to numb the site where the catheter (tube) will enter your body.  This may be in the neck or groin - but likely your neck.  You will not receive sedation or anesthesia.   After the procedure you  will recover for a short period and then be discharged.    Pre procedure instructions:     1.  Do not eat any solid food or milk products for 6 hours prior to the exam. You may drink clear liquids until 2 hours prior to the exam. Clear liquids include the following: water, Jell-O, clear broth, apple juice or any non-carbonated drink that you can see through (no soda).   2. Do not drink alcohol or smoke 24 hours prior to test.  3. Hold these meds: None    4. You may take your other morning medications as prescribed with a sip of water. You may hold your diuretic that morning.   5. Please arrange for a ride to drop you off and pick you up in the instance you are unable to drive home, however you should be able to function as you normally would after the procedure  6. Take your temperature in the morning prior to coming in.  If your temperature is 100 F please call [phone] (Opt. 1) and notify them.  If you do not have access to a thermometer at home, please come in for testing.  If you are running a temperature your procedure may be rescheduled.    We are located on the third floor of the Clinic and Surgery Center ([facility]) on the [facility].  Our address is     [address] [place] [address] [place] [address] [place] [address]      Thank you for allowing us to be a part of your care here at the [facility]    If you have questions or concerns please contact us at:    Zully Vargas RN, BSN, MHA, PHN, CHFN  Renea Shaikh (Schedule,Prior Auth)  Nurse Coordinator       Clinic   Pulmonary Hypertension     Pulmonary Hypertension  [facility]   [facility]  (Phone)[phone]     (Phone) [phone]          (Fax)[phone]                ** Please note that you will NOT receive a reminder call regarding your scheduled testing, reminder calls are for provider appointments only.  If you are  scheduled for testing within the [employer] system you may receive a call regarding pre-registration for billing purposes only.**     Support Group:  Pulmonary Hypertension Association  Https://www.phassociation.org/  **Look at the Events Tab** They even have Support Groups that you can call into    [facility] Support Group  Second Saturday of the Month from 1-3 PM   Location: [address] [place] [address]  Leader: Nimo Beltre  Phone: [phone]  Email: [email or web address]

## 2022-08-23 LAB
ATRIAL RATE - MUSE: 91 BPM
DIASTOLIC BLOOD PRESSURE - MUSE: NORMAL MMHG
INTERPRETATION ECG - MUSE: NORMAL
P AXIS - MUSE: 80 DEGREES
PR INTERVAL - MUSE: 162 MS
QRS DURATION - MUSE: 104 MS
QT - MUSE: 374 MS
QTC - MUSE: 460 MS
R AXIS - MUSE: 111 DEGREES
SYSTOLIC BLOOD PRESSURE - MUSE: NORMAL MMHG
T AXIS - MUSE: 34 DEGREES
VENTRICULAR RATE- MUSE: 91 BPM

## 2022-09-08 ENCOUNTER — OFFICE VISIT (OUTPATIENT)
Dept: PULMONOLOGY | Facility: CLINIC | Age: 67
End: 2022-09-08
Attending: INTERNAL MEDICINE
Payer: COMMERCIAL

## 2022-09-08 ENCOUNTER — LAB (OUTPATIENT)
Dept: LAB | Facility: CLINIC | Age: 67
End: 2022-09-08
Attending: INTERNAL MEDICINE
Payer: COMMERCIAL

## 2022-09-08 ENCOUNTER — ANCILLARY PROCEDURE (OUTPATIENT)
Dept: CARDIOLOGY | Facility: CLINIC | Age: 67
End: 2022-09-08
Attending: INTERNAL MEDICINE
Payer: COMMERCIAL

## 2022-09-08 ENCOUNTER — ANCILLARY PROCEDURE (OUTPATIENT)
Dept: CT IMAGING | Facility: CLINIC | Age: 67
End: 2022-09-08
Attending: INTERNAL MEDICINE
Payer: COMMERCIAL

## 2022-09-08 VITALS
HEART RATE: 70 BPM | OXYGEN SATURATION: 100 % | HEIGHT: 72 IN | SYSTOLIC BLOOD PRESSURE: 117 MMHG | WEIGHT: 174 LBS | BODY MASS INDEX: 23.57 KG/M2 | DIASTOLIC BLOOD PRESSURE: 76 MMHG

## 2022-09-08 DIAGNOSIS — J84.10 FIBROTIC LUNG DISEASES (H): ICD-10-CM

## 2022-09-08 DIAGNOSIS — I27.20 PULMONARY HYPERTENSION (H): ICD-10-CM

## 2022-09-08 DIAGNOSIS — J84.9 ILD (INTERSTITIAL LUNG DISEASE) (H): ICD-10-CM

## 2022-09-08 DIAGNOSIS — J84.10 PULMONARY FIBROSIS (H): Primary | ICD-10-CM

## 2022-09-08 LAB
ALBUMIN SERPL BCG-MCNC: 3.9 G/DL (ref 3.5–5.2)
ALP SERPL-CCNC: 113 U/L (ref 40–129)
ALT SERPL W P-5'-P-CCNC: 13 U/L (ref 10–50)
AST SERPL W P-5'-P-CCNC: 27 U/L (ref 10–50)
BILIRUB DIRECT SERPL-MCNC: <0.2 MG/DL (ref 0–0.3)
BILIRUB SERPL-MCNC: 0.4 MG/DL
DLCOUNC-%PRED-PRE: 21 %
DLCOUNC-PRE: 6.11 ML/MIN/MMHG
DLCOUNC-PRED: 28.44 ML/MIN/MMHG
ERV-%PRED-PRE: 97 %
ERV-PRE: 1.47 L
ERV-PRED: 1.51 L
EXPTIME-PRE: 7.8 SEC
FEF2575-%PRED-PRE: 100 %
FEF2575-PRE: 2.84 L/SEC
FEF2575-PRED: 2.84 L/SEC
FEFMAX-%PRED-PRE: 102 %
FEFMAX-PRE: 9.51 L/SEC
FEFMAX-PRED: 9.28 L/SEC
FEV1-%PRED-PRE: 89 %
FEV1-PRE: 3.22 L
FEV1FEV6-PRE: 80 %
FEV1FEV6-PRED: 78 %
FEV1FVC-PRE: 80 %
FEV1FVC-PRED: 76 %
FEV1SVC-PRE: 83 %
FEV1SVC-PRED: 69 %
FIFMAX-PRE: 4.52 L/SEC
FVC-%PRED-PRE: 85 %
FVC-PRE: 4.05 L
FVC-PRED: 4.76 L
IC-%PRED-PRE: 65 %
IC-PRE: 2.42 L
IC-PRED: 3.72 L
LVEF ECHO: NORMAL
PROT SERPL-MCNC: 7.3 G/DL (ref 6.4–8.3)
VA-%PRED-PRE: 76 %
VA-PRE: 5.31 L
VC-%PRED-PRE: 74 %
VC-PRE: 3.89 L
VC-PRED: 5.23 L

## 2022-09-08 PROCEDURE — 80076 HEPATIC FUNCTION PANEL: CPT | Performed by: PATHOLOGY

## 2022-09-08 PROCEDURE — 94729 DIFFUSING CAPACITY: CPT | Performed by: INTERNAL MEDICINE

## 2022-09-08 PROCEDURE — 93306 TTE W/DOPPLER COMPLETE: CPT | Performed by: INTERNAL MEDICINE

## 2022-09-08 PROCEDURE — 36415 COLL VENOUS BLD VENIPUNCTURE: CPT | Performed by: PATHOLOGY

## 2022-09-08 PROCEDURE — 99214 OFFICE O/P EST MOD 30 MIN: CPT | Mod: 25 | Performed by: INTERNAL MEDICINE

## 2022-09-08 PROCEDURE — G0463 HOSPITAL OUTPT CLINIC VISIT: HCPCS | Mod: 25

## 2022-09-08 PROCEDURE — 94375 RESPIRATORY FLOW VOLUME LOOP: CPT | Performed by: INTERNAL MEDICINE

## 2022-09-08 PROCEDURE — 71250 CT THORAX DX C-: CPT | Mod: GC | Performed by: RADIOLOGY

## 2022-09-08 RX ORDER — ACYCLOVIR 200 MG/1
10 CAPSULE ORAL ONCE
Status: COMPLETED | OUTPATIENT
Start: 2022-09-08 | End: 2022-09-08

## 2022-09-08 RX ADMIN — ACYCLOVIR 10 ML: 200 CAPSULE ORAL at 09:33

## 2022-09-08 ASSESSMENT — PAIN SCALES - GENERAL: PAINLEVEL: NO PAIN (0)

## 2022-09-08 NOTE — LETTER
"    9/8/2022         RE: Deo Wilkinson  [address]  [place] [address]        Dear Colleague,    Thank you for referring your patient, Deo Wilkinson, to the [facility] FOR LUNG SCIENCE AND [facility]. Please see a copy of my visit note below.    [facility]  Pulmonary Medicine  Visit Clinic Note    Dear patient. Thank you for visiting with me. I want you to feel respected, understood, and empowered. \"Respect\" is valuing you as much as I would a close family member. \"Empowerment\" happens when you are fully informed, and can make the best possible decision for you.  Please ask me questions!  Challenge anything that is not clear.       ASSESSMENT & PLAN     Patient is a 67 year old old male who has been referred to pulmonary clinic for further management of CPFE/ UIP     #Pulmonary fibrosis most likely CPFE. With possible UIP patten  #Severe Hypoxemic respiratory failure   #Group III Pulmonary Hypertension   #Dyspnea on  Exertion   -Patient has had severe fibrosis which can explain  His hypoxemia and shortness of breath.   - His reduction in diffusion capacity is significantly increased compared to his spirometry numbers.  -He is not able to tolerate ofev well. Dropped his dose of ofev to 100 BID.   - He will be started on Tyvaso by Cardiology.  - Has been using vest therapy.  Normal Bicarb levels   -I have also placed a lung transplant referral as he has significant lung disease with hypoxemic respiratory failure.  -He has limited interest for now but has agreed to continue with the process.  Discussed [id number].  No need for now. He needs to increase his functional capacity. Offered exercise while sitting.     #Positive SSA and mildly increased ESR  -No prednisone treatment is required on this patient.  As no groundglass opacity was noted.  Mainly lung fibrosis was noted.  - Patient has been recommended to see a rheumatology.  Patient will see rheumatology in " [facility].    -His case was discussed in the ILD conference.  Assessment and plan as above.      Vaccine:   - Has had covid vaccine 3.   - Is up to date on pneumococcal vaccine.     RTC in 4 months PFTs.     31 minutes excluding the time spent on cigarette cessation was  spent on the date of the encounter doing chart review, history and exam, documentation and further activities as noted above.    These conclusions are made at the best of one's knowledge and belief based on the provided evidence such as patient's history and allergy test results and they can change over time or can be incomplete because of missing information's.    I explained the lab values, imagings and findings to the patient.  Patient expressed understanding I did not recognize any barriers to the understanding of the patient.    The above note was dictated using voice recognition software and may include typographical errors. Please contact the author for any clarifications.    Dane De La Vega MD   RN Coordinators: Jose/Maria Dolores: [phone]  ILD RN Coordinators: [phone]  Clinic Number: [phone]  Pager: [phone]       Today's visit note:     Chief Complaint: Deo Wilkinson is a 67 year old year old male who is being seen for No chief complaint on file.      HPI:   Patient is a 67 year old male who has been referred to pulmonary clinic for further work-up of his dyspnea on exertion.  Patient has been followed by pulmonary nurse practitioner in [facility].  He has been treated with inhalers.  He was noted to have lung fibrosis and worsening dyspnea.    -He has had 2 recent hospitalization in November as well as December and since then his shortness of breath has continued to worsen.  He was referred to pulmonary clinic here for further work-up.    - No dizziness or lightheadedness is noted.     #9/8/22  - Patient has limited functional capacity.     ILD exposure questions:  Occupation: Retired. Worked in trucks ,  construction. Also worked n [employer]. Was exposed to dust.  2 years ago.   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   Pet bird: a dog.    Hot tub: No   Portable humidifier: Yes. Keeps it clean  Natural gas   Smoking tobacco or marijuana: 40 years. Stopepd smoking a month ago.  Half pakc per day  Hobbies: Hunting. [place].   Chemotherapy or radiation therapy: None   Fam hx of ILD: Lung cancer/ smoking.  None      Medical History:   - History of CAD. 2 stents.  2005            Medications:     Current Outpatient Medications   Medication     acetaminophen (TYLENOL) 500 MG tablet     albuterol (PROVENTIL) (2.5 MG/3ML) 0.083% neb solution     aspirin (ASA) 81 MG chewable tablet     atorvastatin (LIPITOR) 20 MG tablet     FLUoxetine (PROZAC) 20 MG capsule     Fluticasone-Umeclidin-Vilanterol (TRELEGY ELLIPTA) 100-62.5-25 MCG/INH oral inhaler     furosemide (LASIX) 40 MG tablet     loratadine (CLARITIN) 10 MG tablet     MULTIPLE VITAMIN PO     nintedanib (OFEV) 150 MG capsule     vitamin (B COMPLEX) capsule     Vitamin D, Cholecalciferol, 10 MCG (400 UNIT) TABS     No current facility-administered medications for this visit.            Review of Systems:       A complete 10 point review of systems was otherwise negative except as noted in the HPI.        PHYSICAL EXAM:  There were no vitals taken for this visit.     General: Well developed, well nourished, No apparent distress  Eyes: Anicteric  Nose: Nasal mucosa with no edema or hyperemia.  No polyps  Ears: Hearing grossly normal  Mouth: Oral mucosa is moist, without any lesions. No oropharyngeal exudate.  Respiratory: Poor air entry is noted.  Cardiac: RRR, normal S1, S2. No murmurs. No JVD  Abdomen: Soft, NT/ND  Musculoskeletal: Extremities normal. No clubbing. No cyanosis. No edema.  Skin: No rash on limited exam  Neuro: Normal mentation. Normal speech.  Psych:Normal affect           Data:   All laboratory and imaging data reviewed.      PFT:         6/2022            PFT  Interpretation:  I personally reviewed and interpreted the PFTs.    ECHo: 3/2022  The estimated ejection fraction is 55-60%.     * Left ventricular segmental wall motion is normal.     * The right ventricle is mildly enlarged.     * Mildly reduced right ventricular systolic function.     * The left atrium is normal in size.     * There is no aortic stenosis with a peak velocity of  96 cm/s.     * There is moderate tricuspid regurgitation.     * Moderate to severe pulmonary hypertension, estimated pulmonary arterial   systolic pressure is  55 mmHg.     * The IVC is normal in size (< 2.1 cm), > 50% respiratory variance, RA   pressure normal at 3 mmHg.     * There is no pericardial effusion visualized.       CXR: I personally reviewed and interpreted the chest x-ray    Chest CT: I personally reviewed and interpreted the CT scan.    2020 6/2022 6/2022 9/2022      Recent Results (from the past 168 hour(s))   Hepatic function panel    Collection Time: 09/08/22  8:01 AM   Result Value Ref Range    Protein Total 7.3 6.4 - 8.3 g/dL    Albumin 3.9 3.5 - 5.2 g/dL    Bilirubin Total 0.4 <=1.2 mg/dL    Alkaline Phosphatase 113 40 - 129 U/L    AST 27 10 - 50 U/L    ALT 13 10 - 50 U/L    Bilirubin Direct <0.20 0.00 - 0.30 mg/dL   General PFT Lab (Please always keep checked)    Collection Time: 09/08/22  9:30 AM   Result Value Ref Range    FVC-Pred 4.76 L    FVC-Pre 4.05 L    FVC-%Pred-Pre 85 %    FEV1-Pre 3.22 L    FEV1-%Pred-Pre 89 %    FEV1FVC-Pred 76 %    FEV1FVC-Pre 80 %    FEFMax-Pred 9.28 L/sec    FEFMax-Pre 9.51 L/sec    FEFMax-%Pred-Pre 102 %    FEF2575-Pred 2.84 L/sec    FEF2575-Pre 2.84 L/sec    TNV7235-%Pred-Pre 100 %    ExpTime-Pre 7.80 sec    FIFMax-Pre 4.52 L/sec    VC-Pred 5.23 L    VC-Pre 3.89 L    VC-%Pred-Pre 74 %    IC-Pred 3.72 L    IC-Pre 2.42 L    IC-%Pred-Pre 65 %    ERV-Pred 1.51 L    ERV-Pre 1.47 L    ERV-%Pred-Pre 97 %    FEV1FEV6-Pred 78 %    FEV1FEV6-Pre 80 %    DLCOunc-Pred 28.44  ml/min/mmHg    DLCOunc-Pre 6.11 ml/min/mmHg    DLCOunc-%Pred-Pre 21 %    VA-Pre 5.31 L    VA-%Pred-Pre 76 %    FEV1SVC-Pred 69 %    FEV1SVC-Pre 83 %   Echocardiogram Complete    Collection Time: 09/08/22  9:33 AM   Result Value Ref Range    LVEF  55-60%      Again, thank you for allowing me to participate in the care of your patient.        Sincerely,        Dane De La Vega MD

## 2022-09-08 NOTE — PROGRESS NOTES
"[facility]  Pulmonary Medicine  Visit Clinic Note    Dear patient. Thank you for visiting with me. I want you to feel respected, understood, and empowered. \"Respect\" is valuing you as much as I would a close family member. \"Empowerment\" happens when you are fully informed, and can make the best possible decision for you.  Please ask me questions!  Challenge anything that is not clear.       ASSESSMENT & PLAN     Patient is a 67 year old old male who has been referred to pulmonary clinic for further management of CPFE/ UIP     #Pulmonary fibrosis most likely CPFE. With possible UIP patten  #Severe Hypoxemic respiratory failure   #Group III Pulmonary Hypertension   #Dyspnea on  Exertion   -Patient has had severe fibrosis which can explain  His hypoxemia and shortness of breath.   - His reduction in diffusion capacity is significantly increased compared to his spirometry numbers.  -He is not able to tolerate ofev well. Dropped his dose of ofev to 100 BID.   - He will be started on Tyvaso by Cardiology.  - Has been using vest therapy.  Normal Bicarb levels   -I have also placed a lung transplant referral as he has significant lung disease with hypoxemic respiratory failure.  -He has limited interest for now but has agreed to continue with the process.  Discussed [id number].  No need for now. He needs to increase his functional capacity. Offered exercise while sitting.     #Positive SSA and mildly increased ESR  -No prednisone treatment is required on this patient.  As no groundglass opacity was noted.  Mainly lung fibrosis was noted.  - Patient has been recommended to see a rheumatology.  Patient will see rheumatology in [facility].    -His case was discussed in the ILD conference.  Assessment and plan as above.      Vaccine:   - Has had covid vaccine 3.   - Is up to date on pneumococcal vaccine.     RTC in 4 months PFTs.     31 minutes excluding the time spent on cigarette cessation was  spent " on the date of the encounter doing chart review, history and exam, documentation and further activities as noted above.    These conclusions are made at the best of one's knowledge and belief based on the provided evidence such as patient's history and allergy test results and they can change over time or can be incomplete because of missing information's.    I explained the lab values, imagings and findings to the patient.  Patient expressed understanding I did not recognize any barriers to the understanding of the patient.    The above note was dictated using voice recognition software and may include typographical errors. Please contact the author for any clarifications.    Dane De La Vega MD   RN Coordinators: Baltazar/Kennedy/Maria Dolores: [phone]  ILD RN Coordinators: [phone]  Clinic Number: [phone]  Pager: [phone]       Today's visit note:     Chief Complaint: Deo Wilkinson is a 67 year old year old male who is being seen for No chief complaint on file.      HPI:   Patient is a 67 year old male who has been referred to pulmonary clinic for further work-up of his dyspnea on exertion.  Patient has been followed by pulmonary nurse practitioner in [facility].  He has been treated with inhalers.  He was noted to have lung fibrosis and worsening dyspnea.    -He has had 2 recent hospitalization in November as well as December and since then his shortness of breath has continued to worsen.  He was referred to pulmonary clinic here for further work-up.    - No dizziness or lightheadedness is noted.     #9/8/22  - Patient has limited functional capacity.     ILD exposure questions:  Occupation: Retired. Worked in trucks , construction. Also worked n [employer]. Was exposed to dust.  2 years ago.   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   Pet bird: a dog.    Hot tub: No   Portable humidifier: Yes. Keeps it clean  Natural gas   Smoking tobacco or marijuana: 40 years. Stopepd smoking a month ago.  Half pakc per day   Hobbies: Hunting. [place].   Chemotherapy or radiation therapy: None   Fam hx of ILD: Lung cancer/ smoking.  None      Medical History:   - History of CAD. 2 stents.  2005            Medications:     Current Outpatient Medications   Medication     acetaminophen (TYLENOL) 500 MG tablet     albuterol (PROVENTIL) (2.5 MG/3ML) 0.083% neb solution     aspirin (ASA) 81 MG chewable tablet     atorvastatin (LIPITOR) 20 MG tablet     FLUoxetine (PROZAC) 20 MG capsule     Fluticasone-Umeclidin-Vilanterol (TRELEGY ELLIPTA) 100-62.5-25 MCG/INH oral inhaler     furosemide (LASIX) 40 MG tablet     loratadine (CLARITIN) 10 MG tablet     MULTIPLE VITAMIN PO     nintedanib (OFEV) 150 MG capsule     vitamin (B COMPLEX) capsule     Vitamin D, Cholecalciferol, 10 MCG (400 UNIT) TABS     No current facility-administered medications for this visit.            Review of Systems:       A complete 10 point review of systems was otherwise negative except as noted in the HPI.        PHYSICAL EXAM:  There were no vitals taken for this visit.     General: Well developed, well nourished, No apparent distress  Eyes: Anicteric  Nose: Nasal mucosa with no edema or hyperemia.  No polyps  Ears: Hearing grossly normal  Mouth: Oral mucosa is moist, without any lesions. No oropharyngeal exudate.  Respiratory: Poor air entry is noted.  Cardiac: RRR, normal S1, S2. No murmurs. No JVD  Abdomen: Soft, NT/ND  Musculoskeletal: Extremities normal. No clubbing. No cyanosis. No edema.  Skin: No rash on limited exam  Neuro: Normal mentation. Normal speech.  Psych:Normal affect           Data:   All laboratory and imaging data reviewed.      PFT:         6/2022            PFT Interpretation:  I personally reviewed and interpreted the PFTs.    ECHo: 3/2022  The estimated ejection fraction is 55-60%.     * Left ventricular segmental wall motion is normal.     * The right ventricle is mildly enlarged.     * Mildly reduced right ventricular systolic function.     * The  left atrium is normal in size.     * There is no aortic stenosis with a peak velocity of  96 cm/s.     * There is moderate tricuspid regurgitation.     * Moderate to severe pulmonary hypertension, estimated pulmonary arterial   systolic pressure is  55 mmHg.     * The IVC is normal in size (< 2.1 cm), > 50% respiratory variance, RA   pressure normal at 3 mmHg.     * There is no pericardial effusion visualized.       CXR: I personally reviewed and interpreted the chest x-ray    Chest CT: I personally reviewed and interpreted the CT scan.    2020 6/2022 6/2022 9/2022      Recent Results (from the past 168 hour(s))   Hepatic function panel    Collection Time: 09/08/22  8:01 AM   Result Value Ref Range    Protein Total 7.3 6.4 - 8.3 g/dL    Albumin 3.9 3.5 - 5.2 g/dL    Bilirubin Total 0.4 <=1.2 mg/dL    Alkaline Phosphatase 113 40 - 129 U/L    AST 27 10 - 50 U/L    ALT 13 10 - 50 U/L    Bilirubin Direct <0.20 0.00 - 0.30 mg/dL   General PFT Lab (Please always keep checked)    Collection Time: 09/08/22  9:30 AM   Result Value Ref Range    FVC-Pred 4.76 L    FVC-Pre 4.05 L    FVC-%Pred-Pre 85 %    FEV1-Pre 3.22 L    FEV1-%Pred-Pre 89 %    FEV1FVC-Pred 76 %    FEV1FVC-Pre 80 %    FEFMax-Pred 9.28 L/sec    FEFMax-Pre 9.51 L/sec    FEFMax-%Pred-Pre 102 %    FEF2575-Pred 2.84 L/sec    FEF2575-Pre 2.84 L/sec    XBX0463-%Pred-Pre 100 %    ExpTime-Pre 7.80 sec    FIFMax-Pre 4.52 L/sec    VC-Pred 5.23 L    VC-Pre 3.89 L    VC-%Pred-Pre 74 %    IC-Pred 3.72 L    IC-Pre 2.42 L    IC-%Pred-Pre 65 %    ERV-Pred 1.51 L    ERV-Pre 1.47 L    ERV-%Pred-Pre 97 %    FEV1FEV6-Pred 78 %    FEV1FEV6-Pre 80 %    DLCOunc-Pred 28.44 ml/min/mmHg    DLCOunc-Pre 6.11 ml/min/mmHg    DLCOunc-%Pred-Pre 21 %    VA-Pre 5.31 L    VA-%Pred-Pre 76 %    FEV1SVC-Pred 69 %    FEV1SVC-Pre 83 %   Echocardiogram Complete    Collection Time: 09/08/22  9:33 AM   Result Value Ref Range    LVEF  55-60%

## 2022-09-08 NOTE — NURSING NOTE
Chief Complaint   Patient presents with     Interstitial Lung Disease (ILD)     ILD Follow up      Vitals were taken and medications were reconciled.     Belkis Ricardo RMA  11:49 AM\

## 2022-09-09 ENCOUNTER — TELEPHONE (OUTPATIENT)
Dept: CARDIOLOGY | Facility: CLINIC | Age: 67
End: 2022-09-09

## 2022-09-09 NOTE — TELEPHONE ENCOUNTER
Call complete for pre procedure reminder and updated visitor policy.  Aware to complete covid test on Saturday or Sunday and take picture of results

## 2022-09-09 NOTE — TELEPHONE ENCOUNTER
Reviewed pre-procedure instructions with patient and spouse. All questions answered. Patient will complete at home COVID test this weekend. Barbara Vivas RN on 9/9/2022 at 3:23 PM    ----- Message from Travis Sawyer CMA sent at 9/9/2022  3:06 PM CDT -----  Regarding: FW: NEED INSTRUCTIONS- PROCEDURE MONDAY  Re: procedure this Monday 9/12/22  ----- Message -----  From: Lilliana Bell RN  Sent: 9/9/2022   3:05 PM CDT  To: [facility] Cardiology Adult [facility]  Subject: NEED INSTRUCTIONS- PROCEDURE MONDAY              Good afternoon,    This pt would like a phone call regarding his cath lab procedure on Monday -- -prep instructions etc.  Can someone please give him a call ? [phone]    Thank you  Bri

## 2022-09-09 NOTE — TELEPHONE ENCOUNTER
----- Message from Travis Sawyer CMA sent at 9/9/2022  3:06 PM CDT -----  Regarding: FW: NEED INSTRUCTIONS- PROCEDURE MONDAY  Re: procedure this Monday 9/12/22  ----- Message -----  From: Lilliana Bell RN  Sent: 9/9/2022   3:05 PM CDT  To: [facility] Cardiology Adult [facility]  Subject: NEED INSTRUCTIONS- PROCEDURE MONDAY              Good afternoon,    This pt would like a phone call regarding his cath lab procedure on Monday -- -prep instructions etc.  Can someone please give him a call ? [phone]    Thank you  Bri

## 2022-09-12 ENCOUNTER — APPOINTMENT (OUTPATIENT)
Dept: MEDSURG UNIT | Facility: CLINIC | Age: 67
End: 2022-09-12
Attending: INTERNAL MEDICINE
Payer: COMMERCIAL

## 2022-09-12 ENCOUNTER — HOSPITAL ENCOUNTER (OUTPATIENT)
Facility: CLINIC | Age: 67
Discharge: HOME | End: 2022-09-12
Attending: INTERNAL MEDICINE | Admitting: INTERNAL MEDICINE
Payer: COMMERCIAL

## 2022-09-12 ENCOUNTER — APPOINTMENT (OUTPATIENT)
Dept: LAB | Facility: CLINIC | Age: 67
End: 2022-09-12
Attending: INTERNAL MEDICINE
Payer: COMMERCIAL

## 2022-09-12 VITALS
RESPIRATION RATE: 20 BRPM | BODY MASS INDEX: 23.53 KG/M2 | DIASTOLIC BLOOD PRESSURE: 84 MMHG | OXYGEN SATURATION: 90 % | HEART RATE: 64 BPM | TEMPERATURE: 97.5 F | WEIGHT: 173.5 LBS | SYSTOLIC BLOOD PRESSURE: 116 MMHG

## 2022-09-12 DIAGNOSIS — I27.20 PULMONARY HYPERTENSION (H): ICD-10-CM

## 2022-09-12 LAB
ALBUMIN SERPL BCG-MCNC: 4 G/DL (ref 3.5–5.2)
ALP SERPL-CCNC: 119 U/L (ref 40–129)
ALT SERPL W P-5'-P-CCNC: 18 U/L (ref 10–50)
ANION GAP SERPL CALCULATED.3IONS-SCNC: 11 MMOL/L (ref 7–15)
AST SERPL W P-5'-P-CCNC: 30 U/L (ref 10–50)
BASOPHILS # BLD AUTO: 0.1 10E3/UL (ref 0–0.2)
BASOPHILS NFR BLD AUTO: 1 %
BILIRUB SERPL-MCNC: 0.6 MG/DL
BUN SERPL-MCNC: 15.6 MG/DL (ref 8–23)
CALCIUM SERPL-MCNC: 9.3 MG/DL (ref 8.8–10.2)
CHLORIDE SERPL-SCNC: 103 MMOL/L (ref 98–107)
CREAT SERPL-MCNC: 0.9 MG/DL (ref 0.67–1.17)
DEPRECATED HCO3 PLAS-SCNC: 25 MMOL/L (ref 22–29)
EOSINOPHIL # BLD AUTO: 0.3 10E3/UL (ref 0–0.7)
EOSINOPHIL NFR BLD AUTO: 4 %
ERYTHROCYTE [DISTWIDTH] IN BLOOD BY AUTOMATED COUNT: 13.7 % (ref 10–15)
GFR SERPL CREATININE-BSD FRML MDRD: >90 ML/MIN/1.73M2
GLUCOSE SERPL-MCNC: 108 MG/DL (ref 70–99)
HBV CORE AB SERPL QL IA: NONREACTIVE
HBV SURFACE AB SERPL IA-ACNC: 0 M[IU]/ML
HBV SURFACE AB SERPL IA-ACNC: NONREACTIVE M[IU]/ML
HBV SURFACE AG SERPL QL IA: NONREACTIVE
HCT VFR BLD AUTO: 48 % (ref 40–53)
HCV AB SERPL QL IA: NONREACTIVE
HGB BLD-MCNC: 15.5 G/DL (ref 13.3–17.7)
HGB BLD-MCNC: 15.7 G/DL (ref 13.3–17.7)
HGB BLD-MCNC: 15.7 G/DL (ref 13.3–17.7)
HGB BLD-MCNC: 15.8 G/DL (ref 13.3–17.7)
HIV 1+2 AB+HIV1 P24 AG SERPL QL IA: NONREACTIVE
IMM GRANULOCYTES # BLD: 0.1 10E3/UL
IMM GRANULOCYTES NFR BLD: 1 %
INR PPP: 1.64 (ref 0.85–1.15)
LYMPHOCYTES # BLD AUTO: 1.7 10E3/UL (ref 0.8–5.3)
LYMPHOCYTES NFR BLD AUTO: 21 %
MCH RBC QN AUTO: 32 PG (ref 26.5–33)
MCHC RBC AUTO-ENTMCNC: 32.7 G/DL (ref 31.5–36.5)
MCV RBC AUTO: 98 FL (ref 78–100)
MONOCYTES # BLD AUTO: 0.9 10E3/UL (ref 0–1.3)
MONOCYTES NFR BLD AUTO: 11 %
NEUTROPHILS # BLD AUTO: 5.3 10E3/UL (ref 1.6–8.3)
NEUTROPHILS NFR BLD AUTO: 62 %
NRBC # BLD AUTO: 0 10E3/UL
NRBC BLD AUTO-RTO: 0 /100
NT-PROBNP SERPL-MCNC: 536 PG/ML (ref 0–900)
OXYHGB MFR BLDV: 52 % (ref 92–100)
OXYHGB MFR BLDV: 62 % (ref 92–100)
OXYHGB MFR BLDV: 62 % (ref 92–100)
PLATELET # BLD AUTO: 240 10E3/UL (ref 150–450)
POTASSIUM SERPL-SCNC: 4.1 MMOL/L (ref 3.4–5.3)
PROT SERPL-MCNC: 7.4 G/DL (ref 6.4–8.3)
RBC # BLD AUTO: 4.9 10E6/UL (ref 4.4–5.9)
SODIUM SERPL-SCNC: 139 MMOL/L (ref 136–145)
WBC # BLD AUTO: 8.4 10E3/UL (ref 4–11)

## 2022-09-12 PROCEDURE — 87340 HEPATITIS B SURFACE AG IA: CPT | Performed by: INTERNAL MEDICINE

## 2022-09-12 PROCEDURE — 250N000009 HC RX 250: Performed by: INTERNAL MEDICINE

## 2022-09-12 PROCEDURE — 85018 HEMOGLOBIN: CPT

## 2022-09-12 PROCEDURE — 93451 RIGHT HEART CATH: CPT | Mod: 26 | Performed by: INTERNAL MEDICINE

## 2022-09-12 PROCEDURE — 83880 ASSAY OF NATRIURETIC PEPTIDE: CPT | Performed by: INTERNAL MEDICINE

## 2022-09-12 PROCEDURE — 36415 COLL VENOUS BLD VENIPUNCTURE: CPT | Performed by: INTERNAL MEDICINE

## 2022-09-12 PROCEDURE — 86704 HEP B CORE ANTIBODY TOTAL: CPT | Performed by: INTERNAL MEDICINE

## 2022-09-12 PROCEDURE — 93463 DRUG ADMIN & HEMODYNMIC MEAS: CPT | Mod: GC | Performed by: INTERNAL MEDICINE

## 2022-09-12 PROCEDURE — 93451 RIGHT HEART CATH: CPT | Performed by: INTERNAL MEDICINE

## 2022-09-12 PROCEDURE — 272N000001 HC OR GENERAL SUPPLY STERILE: Performed by: INTERNAL MEDICINE

## 2022-09-12 PROCEDURE — 87389 HIV-1 AG W/HIV-1&-2 AB AG IA: CPT | Performed by: INTERNAL MEDICINE

## 2022-09-12 PROCEDURE — 86706 HEP B SURFACE ANTIBODY: CPT | Performed by: INTERNAL MEDICINE

## 2022-09-12 PROCEDURE — 82810 BLOOD GASES O2 SAT ONLY: CPT

## 2022-09-12 PROCEDURE — 80053 COMPREHEN METABOLIC PANEL: CPT | Performed by: INTERNAL MEDICINE

## 2022-09-12 PROCEDURE — 85610 PROTHROMBIN TIME: CPT | Performed by: INTERNAL MEDICINE

## 2022-09-12 PROCEDURE — 85025 COMPLETE CBC W/AUTO DIFF WBC: CPT | Performed by: INTERNAL MEDICINE

## 2022-09-12 PROCEDURE — 93463 DRUG ADMIN & HEMODYNMIC MEAS: CPT | Performed by: INTERNAL MEDICINE

## 2022-09-12 PROCEDURE — 86803 HEPATITIS C AB TEST: CPT | Performed by: INTERNAL MEDICINE

## 2022-09-12 RX ORDER — LIDOCAINE 40 MG/G
CREAM TOPICAL
Status: COMPLETED | OUTPATIENT
Start: 2022-09-12 | End: 2022-09-12

## 2022-09-12 RX ADMIN — LIDOCAINE: 40 CREAM TOPICAL at 12:58

## 2022-09-12 ASSESSMENT — ACTIVITIES OF DAILY LIVING (ADL)
ADLS_ACUITY_SCORE: 35
ADLS_ACUITY_SCORE: 35

## 2022-09-12 NOTE — PROGRESS NOTES
Patient tolerated recovery stage well. VSS, right neck site clean/dry/intact, no hematoma, and denies pain. Patient tolerated PO food and fluids. Teaching was done and discharge instructions were given. Patient discharged from the hospital via wheel chair to home with family on O2 from wheelchair..

## 2022-09-12 NOTE — DISCHARGE INSTRUCTIONS
[facility]                        Interventional Cardiology  Discharge Instructions   Post Right Heart Cath      AFTER YOU GO HOME:  DO drink plenty of fluids  DO resume your regular diet and medications unless otherwise instructed by your Primary Physician  Do Not scrub the procedure site vigorously  No lotion or powder to the puncture site for 3 days    CALL YOUR PRIMARY PHYSICIAN IF: You may resume all normal activity.  Monitor neck site for bleeding, swelling, or voice changes. If you notice bleeding or swelling immediately apply pressure to the site and call number below to speak with Cardiology Fellow.  If you experience any changes in your breathing you should call your doctor immediately or come to the closest Emergency Department.  Do not drive yourself.    ADDITIONAL INSTRUCTIONS: Medications: You are to resume all home medications including anticoagulation therapy unless otherwise advised by your primary cardiologist or nurse coordinator.    Follow Up: Per your primary cardiology team    If you have any questions or concerns regarding your procedure site please call [phone] at anytime and ask for Cardiology Fellow on call.  They are available 24 hours a day.  You may also contact the Cardiology Clinic after hours number at [phone].                                                       Telephone Numbers [phone] Monday-Friday 8:00 am to 4:30 pm    [phone] After 4:30 pm Monday-Friday, Weekends & Holidays  Ask for Interventional Cardiologist on call. Someone is on call 24 hours/day   [facility] toll free number [phone] Monday-Friday 8:00 am to 4:30 pm   [facility] Emergency Dept [phone]

## 2022-09-12 NOTE — Clinical Note
dry, intact, no bleeding and no hematoma. 7Fr sheath removed from RIJ. Manual pressure held. Hemostasis obtained.

## 2022-09-22 ENCOUNTER — TELEPHONE (OUTPATIENT)
Dept: CARDIOLOGY | Facility: CLINIC | Age: 67
End: 2022-09-22

## 2022-09-22 NOTE — TELEPHONE ENCOUNTER
Unable to reach on home phone. LM on cell; can schedule for a virtual visit on 10/4 at 3 PM. Asked patient to call me back to confirm date and time. Barbara Vivas RN on 9/22/2022 at 2:34 PM    [facility]    Phone Message    May a detailed message be left on voicemail: yes     Reason for Call: Requesting Results   Name/type of test: angiogram  Date of test: 9/12  Was test done at a location other than [facility] (Please fill in the location if not [facility])?: No    Patient states Dr Martin was suppose to call patient with how the angiogram went and what was found.     Action Taken: Other: cardiology    Travel Screening: Not Applicable

## 2022-09-23 ENCOUNTER — DOCUMENTATION ONLY (OUTPATIENT)
Dept: CARDIOLOGY | Facility: CLINIC | Age: 67
End: 2022-09-23

## 2022-09-23 ENCOUNTER — DOCUMENTATION ONLY (OUTPATIENT)
Dept: CARDIOLOGY | Facility: CLINIC | Age: 67
End: 2022-09-23
Payer: COMMERCIAL

## 2022-09-23 PROCEDURE — 99207 PR NO CHARGE LOS: CPT | Performed by: INTERNAL MEDICINE

## 2022-09-23 NOTE — PROGRESS NOTES
Dear Dr. De La Vega,    Mr. Wilkinson has completed his testing for pulmonary hypertension.    His right heart catheterization showed moderate pulmonary hypertension with a mean PA pressure of 25 and PVR of 5.7.  His cardiac output was 2.9 with an index of 1.4 L/min/m .  His PA saturation was 51.5%.  He had acute vasoreactivity testing with inhaled nitric oxide.  His PA pressure did not change but PVR dropped to 3.4 Wood units with an increase in cardiac output to 3.83 L/min with an index of 1.91.    His echocardiogram showed moderately dilated right ventricle with moderately reduced right ventricular function.  He had mild tricuspid insufficiency with an estimated right ventricular systolic pressure 50 mmHg.  Right atrium was mildly enlarged.  IVC was small consistent with hypovolemia.  No pericardial effusion.  No other significant valvular abnormalities.    His high-resolution CT scan of the chest, confirmed combined pulmonary fibrosis and emphysema.    His serological work-up for other associated causes of pulmonary hypertension including HIV hepatitis rheumatoid factor and MILDRED were unremarkable.  His NT proBNP was mildly elevated.  His renal function electrolytes liver function and CBC were unremarkable.    Impression  1.  Pulmonary arterial hypertension out of proportion to combined pulmonary fibrosis and emphysema  2.  Moderate RV dysfunction with severely reduced cardiac index  3.  No evidence of decompensated right heart failure.  Normal biventricular filling pressure.    Plan  1.  Discussed the risk and benefits of inhaled treprostinil therapy with the patient who is agreeable.  We will start him at 3 breaths 4 times a day and gradually increase it as tolerated to a maximum of 12 breaths 4 times a day.  We will start him on Tyvaso DPI if covered by his [facility] insurance.  Otherwise he will go for Tyvaso nebulizer.    2.  He is currently euvolemic/in fact on the dry side on the current dose of diuretics which she will  continue once a day Lasix 40 mg daily.    3.  He will continue to use supplemental oxygen 6 to 8 L with exertion and 4 L at rest.    4.  Patient not interested in lung transplantation.  He understands his overall prognosis is poor.  His median survival without treatment is 2.5 to 3 years.    5.  Patient return to see us in a month after starting inhaled treprostinil.    Sincerely,  Veronica Martin MD   [facility]  Heart Failure, Transplant, and Mechanical Circulatory Support Cardiology   Cardiovascular Division  [facility]   [phone]

## 2022-09-27 ENCOUNTER — TELEPHONE (OUTPATIENT)
Dept: CARDIOLOGY | Facility: CLINIC | Age: 67
End: 2022-09-27

## 2022-09-27 DIAGNOSIS — I27.20 PULMONARY HYPERTENSION (H): Primary | ICD-10-CM

## 2022-09-27 NOTE — TELEPHONE ENCOUNTER
*Tyvaso DPI referral form completed and faxed to([phone]) [facility] along with right heart catheterization report, clinic note, echocardiogram report, CT scan and 6 minute walk test for expedited review and assistance with obtaining the pt's prior authorization.  [facility] ([phone])      Renea Shaikh  Clinic    Pulmonary Hypertension   [facility]  (P) [phone]

## 2022-09-28 RX ORDER — TREPROSTINIL 16-32-48
KIT INHALATION
Qty: 1 EACH | Refills: 11 | OUTPATIENT
Start: 2022-09-28 | End: 2022-09-28

## 2022-09-28 RX ORDER — TREPROSTINIL 16-32-48
KIT INHALATION
Qty: 1 EACH | Refills: 11 | Status: SHIPPED | OUTPATIENT
Start: 2022-09-28 | End: 2022-01-01

## 2022-10-03 ENCOUNTER — HEALTH MAINTENANCE LETTER (OUTPATIENT)
Age: 67
End: 2022-10-03

## 2022-10-04 NOTE — TELEPHONE ENCOUNTER
*Tyvaso DPI referral form completed and faxed to([phone]) [facility] along with right heart catheterization report, clinic note, echocardiogram report, CT scan and 6 minute walk test for expedited review and assistance with obtaining the pt's prior authorization.  [facility] ([phone])      Renea Shaikh  Clinic    Pulmonary Hypertension   [facility]  (P) [phone]

## 2022-10-12 NOTE — TELEPHONE ENCOUNTER
[facility] asked for additional information which I filled out, sent the Tyvaso study for ILD patients, the questions asked, and a letter/ RHC. Faxed it all to [phone]          Renea Shaikh  Clinic    Pulmonary Hypertension   [facility]  (P) [phone]

## 2022-10-18 NOTE — TELEPHONE ENCOUNTER
Talked with the pharmacy rep and she stated that the tyavso DPI kit has been approved and they sent over the referral to their speciality pharmacy. I did ask what the name of that is and she didn't know the answer. I did ask for a determination letter for the approval and she sent me to medical records. I left a voicemail for them to reach me .       Renea Shaikh  [facility]    Pulmonary Hypertension   [facility]  P) [phone]

## 2022-10-31 NOTE — TELEPHONE ENCOUNTER
Writer called pt to coordinate appointment from referral received from [facility].  Pt stated he still has not received medication.  Pt stated it is still in limbo.  Please reach out to confirm [facility] has all info needed for medication.      Pt/ pt's wife stated they can't schedule with [facility] until pt starts medication because he needs to be taking medication for one month for follow up appt.

## 2022-11-09 NOTE — TELEPHONE ENCOUNTER
11/9/2022 2:19 PM -   Checked with [employer] portal via chat on pt's med shipment status; PA is approved through the [facility].     First shipment was received:    11/3/22; pt can schedule with Dr. Martin for 12/2/22 or after for follow up.   Routing to Renea Shaikh for scheduling.      Prior Authorization Approval    Authorization Effective Date:    Authorization Expiration Date: 3/5/2023  Medication: PA Tyvaso DPI titration kit/ maintenance kit   Approved Dose/Quantity:   Reference #:     Insurance Company: Comment:  [facility] BENEFITS  Expected CoPay:       CoPay Card Available:      Foundation Assistance Needed:    Which Pharmacy is filling the prescription (Not needed for infusion/clinic administered): [facility] - [place], [place] - [address] [place] [address]    Updated Snapshot, added to PA Calendar    Sil THORNTON CMA  Clinical   Cardiology/Pulmonary Hypertension   [facility]  PH: [phone]     Walking

## 2022-11-18 NOTE — TELEPHONE ENCOUNTER
Lab orders faxed to [facility] at (f) [phone]. Barbara Vivas RN on 11/18/2022 at 10:28 AM

## 2022-11-18 NOTE — TELEPHONE ENCOUNTER
[facility] Call Center    Phone Message    May a detailed message be left on voicemail: yes     Reason for Call: Order(s): Other:   Reason for requested: [facility] to [facility] [place]  Date needed: 11/18/22  Provider name: Geneva Gould calling to schedule Tyvaso labs/follow-up. She says she wants Doe to do the appointments at the [facility]. Please send lab orders there if possible and follow up with Bryanna and Deo once complete.    Thank you!  [facility]        Action Taken: Other: Cardiology    Travel Screening: Not Applicable

## 2022-12-02 NOTE — TELEPHONE ENCOUNTER
Patient is on 64mcg QID. Tolerating well. Completed labs at [facility] on 11/30/22.     Angie Peters RN on 12/2/2022 at 9:52 AM

## 2022-12-12 NOTE — PROGRESS NOTES
Edie is a 67 year old who is being evaluated via a billable telephone visit.      Pt states in [place] for visit.     What phone number would you like to be contacted at? [phone]   How would you like to obtain your AVS? Elvi Almazan, MUNIR/PANCHO    Vitals - Patient Reported  Systolic (Patient Reported): 105 (Dec 7, after inhalation Tyvaso)  Diastolic (Patient Reported): 72  Pulse (Patient Reported): 93 (Dec 7, after inhalation Tyvaso)  Pain Score: No Pain (0)           CARDIOLOGY PH CLINIC TELEPHONE VISIT    Date of telephone visit: 12/13/22      Deo Wilkinson is a 67 year old male who is being evaluated via a billable telephone visit.      I have reviewed and updated the patient's Past Medical History, Social History, Family History and Medication List.      MEDICATIONS:  Current Outpatient Medications   Medication Sig Dispense Refill     acetaminophen (TYLENOL) 500 MG tablet Take 500-1,000 mg by mouth       albuterol (PROVENTIL) (2.5 MG/3ML) 0.083% neb solution Inhale 2.5 mg into the lungs daily       aspirin (ASA) 81 MG chewable tablet Take 81 mg by mouth daily       atorvastatin (LIPITOR) 20 MG tablet Take 10 mg by mouth daily       FLUoxetine (PROZAC) 20 MG capsule Take 40 mg by mouth daily       Fluticasone-Umeclidin-Vilanterol (TRELEGY ELLIPTA) 100-62.5-25 MCG/INH oral inhaler        furosemide (LASIX) 40 MG tablet Take 40 mg by mouth daily       loratadine (CLARITIN) 10 MG tablet Take 1 tablet by mouth daily       MULTIPLE VITAMIN PO Take 1 tablet by mouth daily       nintedanib (OFEV) 100 MG capsule Take 1 capsule (100 mg) by mouth 2 times daily for 240 days (Patient taking differently: Take 150 mg by mouth daily) 120 capsule 3     Treprostinil (TYVASO DPI MAINTENANCE KIT) 64 MCG POWD Inhale 64 mcg into the lungs 4 times daily  11     vitamin (B COMPLEX) capsule Take 1 capsule by mouth daily       Vitamin D, Cholecalciferol, 10 MCG (400 UNIT) TABS Take 1 capsule by mouth daily          ALLERGIES  Penicillins    Primary PH cardiologist: Dr. Martin      HPI:  Mr. Wilkinson is a pleasant 67 year old male with a PMHx including coronary artery disease (s/p PCI in 2004), and combined pulmonary fibrosis and emphysema, previously followed by pulmonary at [facility]. Over the last few years he has had a gradual decline in his FITZGERALD and oxygen requirements have been increasing. He was then referred to the ILD clinic here at the [facility] in June and placed on antifibrotic therapy. Echocardiogram showed severely elevated RV pressures with moderately reduced RV function. He then saw Dr. Martin in August and was referred for RHC for definitive diagnosis.     His RHC was performed in late September and showed moderate PAH with mean PA pressure of 25 and PVR of 5.7.  He had acute vasoreactivity testing with inhaled nitric oxide.  His PA pressure did not change but PVR dropped to 3.4 Wood units with an increase in cardiac output. Repeat echo again showed moderately dilated right ventricle with moderately reduced right ventricular function. Additional secondary testing for PH was unremarkable. Thus, he was felt to have group 3 PH in the setting of his CPFE and inhaled Tyvaso was recommended. He was not interested in pursuing lung transplant.    Today, I am meeting Edie virtually to follow up. He tells me that he is now on Tyvaso DPI at 64mcg dose for the last few weeks, and has been doing ok. He denies significant side effects such as headaches or GI upset. He has not yet noted any improvement in his breathing. He still gets winded easily and his oxygen levels drop quickly with exertion. He wears between 5-8LPM. Sats will drop to the mid 70s when he walks around but when he sits will recover to low 90s within a minute or two. He denies any new LE edema, but does not weigh routinely at home.     The patient did not have any new labs performed for our visit today, but most [facility]  were reviewed as below.     CURRENT PULMONARY HYPERTENSION REGIMEN:    PAH Rx: Tyvaso 64mcg QID    Diuretics: Lasix 40mg daily    Oxygen: 5-8LPM     Anticoagulation: None    Pulm: Dr. De La Vega      ASSESSMENT/PLAN:      1. Pulmonary hypertension:   --Mr. Wilkinson has moderate group 3 PAH with RV dysfunction, in the setting of CPFE. He is now on Tyvaso DPI 64mcg QID and tolerating well. Clinically, he has not yet noted any changes and continues to get winded easily with exertion. Sats also drop with exertion but he feels this is no worse than previously, and recovers quickly with rest.   --He reports no new edema, currently on Lasix 40mg daily. Renal function preserved. He does not weigh himself routinely.   --He remains on supplemental oxygen and follows with Dr. De La Vega. Notably,in the past he has relayed that he is not interested in pursuing lung transplant evaluation, though we did not directly address this today.      Follow up plan: Return in 3 months to see Dr. Martin in clinic with echocardiogram and repeat labs.       Testing/labs:      Most recent labs:               Other recent pertinent testing:    RHC 9/12/22  Hemodynamics    RA  --/--/2 mmHg  RV 40/2  mmHg  PA 40/15/25  mmHg  CWP --/--/9 mmHg  CO 2.87 (Tod) L/min  CI 1.91 (Tod) L/min/m2  CO 2.8 (TD) L/min  CI 1.4 (TD) L/min/m2    PA Sat: 51.5%  PVR 5.7 (TD) EYUNG      VASODILATOR TESTING (Dominik 80 PPM)  PA mmHg 39/15/25   CWP --/--/12 mmHg  CO 3.83 (Tod) L/min  CI 1.91 (Tod) L/min/m2  CO 2.8  (TD) L/min  CI 1.4 (TD) L/min/m2    PA Sat: 62.4%  PVR 4.6 YEUNG   Right sided filling pressures are normal. Left sided filling pressures are normal. Mild elevated pulmonary hypertension. Reduced cardiac output level. Hemodynamic data has been modified in Epic per physician review.       Echo 9/8/22  Interpretation Summary  Left ventricular function is normal.The ejection fraction is 55-60%.  Paradoxical septal motion consistent with right ventricular pressure overload  is  present.  Mild right ventricular dilation is present. Global right ventricular function  is mildly to moderately reduced.  Mild tricuspid insufficiency is present.  Pulmonary hypertension is present. Estimated pulmonary artery systolic  pressure is 50 mmHg plus right atrial pressure.  Small inferior vena cava size consistent with hypovolemia.  No pericardial effusion is present.      NYHA Functional Class:  3b      I have reviewed the note as documented above.  This accurately captures the substance of my conversation with the patient.      Phone call contact time  Call Started at 0824  Call Ended at 0834    An additional 15 minutes was spent today performing chart and history review, pre and post visit documentation, review of recent testing, and care coordination.      Geneva Wolfe PA-C  [facility]  Pager [phone]

## 2022-12-13 NOTE — NURSING NOTE
Chief Complaint   Patient presents with     Follow Up     States BP has been normal except two days that were a little off       Nov 29th pt reported BP reading    100/65 HR: 89   67/46 HR 83(15 min after inhalation)  97/73 HR: 80 (1 hr after)    Rosemarie Almazan, VF/CMA

## 2022-12-13 NOTE — PATIENT INSTRUCTIONS
Thank you for visiting the Pulmonary Hypertension Clinic virtually today.      Today we discussed:   No medication changes, continue as you are.      Follow up Appointment Information:  Return in 3 months to see Dr. Martin in clinic with repeat labs and echocardiogram.       Additional Instructions:    1. Continue staying active and eat a heart healthy, low sodium diet.     2. Please keep current list of medications with you at all times.     3. Remember to weigh yourself daily after voiding and write it down on a log. If you have gained/lost 2 pounds overnight or 5 pounds in a week contact us for medication adjustments or further instructions.    4. Please call us immediately if you have syncope (fainting or passing out), chest pain, worsening edema (swelling or weight gain), or general worsening in how you are feeling.       -----------------------------------------------------------------------------------------------------------------    If you have questions or concerns please contact us at:    Angie Peters RN, BSN     Nurse Coordinator        Pulmonary Hypertension      [facility]                (P)[phone]         Sil Nguyen, PANCHO Shaikh   (Prior Authorizations)    ()  Clinic   Clinic   Pulmonary Hypertension   Pulmonary Hypertension  [facility]  (P)[phone]    (P) [phone]  (F) [phone]                      ** Please note that you will NOT receive a reminder call regarding your scheduled testing, reminder calls are for provider appointments only.  If you are scheduled for testing within the [employer] system you may receive a call regarding pre-registration for billing purposes only.**     --------------------------------------------------------------------------------------------------------------    Interested in  joining a support group?    Pulmonary Hypertension Association  Https://www.phassociation.org/  **Look at the Events Tab** They even have Support Groups that you can call into    [facility] Support Group  Second Saturday of the Month from 1-3 PM   Location: [address] [place] [address] (Currently Virtual)  Leader: Nimo Beltre   Phone: [phone]   Email: [email or web address]     Great Videos about Pulmonary Hypertension!!  Scan ME!    Website: [email or web address]

## 2022-12-13 NOTE — LETTER
12/13/2022      RE: Deo Wilkinson  [address] [facility] [place] [facility] [place] [address]       Dear Colleague,    Thank you for the opportunity to participate in the care of your patient, Deo Wilkinson, at the [facility] [place] [facility]. Please see a copy of my visit note below.      CARDIOLOGY PH CLINIC TELEPHONE VISIT    Date of telephone visit: 12/13/22      Deo Wilkinson is a 67 year old male who is being evaluated via a billable telephone visit.      I have reviewed and updated the patient's Past Medical History, Social History, Family History and Medication List.      MEDICATIONS:  Current Outpatient Medications   Medication Sig Dispense Refill     acetaminophen (TYLENOL) 500 MG tablet Take 500-1,000 mg by mouth       albuterol (PROVENTIL) (2.5 MG/3ML) 0.083% neb solution Inhale 2.5 mg into the lungs daily       aspirin (ASA) 81 MG chewable tablet Take 81 mg by mouth daily       atorvastatin (LIPITOR) 20 MG tablet Take 10 mg by mouth daily       FLUoxetine (PROZAC) 20 MG capsule Take 40 mg by mouth daily       Fluticasone-Umeclidin-Vilanterol (TRELEGY ELLIPTA) 100-62.5-25 MCG/INH oral inhaler        furosemide (LASIX) 40 MG tablet Take 40 mg by mouth daily       loratadine (CLARITIN) 10 MG tablet Take 1 tablet by mouth daily       MULTIPLE VITAMIN PO Take 1 tablet by mouth daily       nintedanib (OFEV) 100 MG capsule Take 1 capsule (100 mg) by mouth 2 times daily for 240 days (Patient taking differently: Take 150 mg by mouth daily) 120 capsule 3     Treprostinil (TYVASO DPI MAINTENANCE KIT) 64 MCG POWD Inhale 64 mcg into the lungs 4 times daily  11     vitamin (B COMPLEX) capsule Take 1 capsule by mouth daily       Vitamin D, Cholecalciferol, 10 MCG (400 UNIT) TABS Take 1 capsule by mouth daily         ALLERGIES  Penicillins    Primary PH cardiologist: Dr. Martin      HPI:  Mr. Wilkinson is a pleasant 67 year old male with a PMHx including coronary  artery disease (s/p PCI in 2004), and combined pulmonary fibrosis and emphysema, previously followed by pulmonary at [facility]. Over the last few years he has had a gradual decline in his FITZGERALD and oxygen requirements have been increasing. He was then referred to the ILD clinic here at the [facility] in June and placed on antifibrotic therapy. Echocardiogram showed severely elevated RV pressures with moderately reduced RV function. He then saw Dr. Martin in August and was referred for RHC for definitive diagnosis.     His RHC was performed in late September and showed moderate PAH with mean PA pressure of 25 and PVR of 5.7.  He had acute vasoreactivity testing with inhaled nitric oxide.  His PA pressure did not change but PVR dropped to 3.4 Wood units with an increase in cardiac output. Repeat echo again showed moderately dilated right ventricle with moderately reduced right ventricular function. Additional secondary testing for PH was unremarkable. Thus, he was felt to have group 3 PH in the setting of his CPFE and inhaled Tyvaso was recommended. He was not interested in pursuing lung transplant.    Today, I am meeting Edie virtually to follow up. He tells me that he is now on Tyvaso DPI at 64mcg dose for the last few weeks, and has been doing ok. He denies significant side effects such as headaches or GI upset. He has not yet noted any improvement in his breathing. He still gets winded easily and his oxygen levels drop quickly with exertion. He wears between 5-8LPM. Sats will drop to the mid 70s when he walks around but when he sits will recover to low 90s within a minute or two. He denies any new LE edema, but does not weigh routinely at home.     The patient did not have any new labs performed for our visit today, but [facility] were reviewed as below.     CURRENT PULMONARY HYPERTENSION REGIMEN:    PAH Rx: Tyvaso 64mcg QID    Diuretics: Lasix 40mg daily    Oxygen: 5-8LPM      Anticoagulation: None    Pulm: Dr. De La Vega      ASSESSMENT/PLAN:      1. Pulmonary hypertension:   --Mr. Wilkinson has moderate group 3 PAH with RV dysfunction, in the setting of CPFE. He is now on Tyvaso DPI 64mcg QID and tolerating well. Clinically, he has not yet noted any changes and continues to get winded easily with exertion. Sats also drop with exertion but he feels this is no worse than previously, and recovers quickly with rest.   --He reports no new edema, currently on Lasix 40mg daily. Renal function preserved. He does not weigh himself routinely.   --He remains on supplemental oxygen and follows with Dr. De La Vega. Notably,in the past he has relayed that he is not interested in pursuing lung transplant evaluation, though we did not directly address this today.      Follow up plan: Return in 3 months to see Dr. Martin in clinic with echocardiogram and repeat labs.       Testing/labs:      Most recent labs:               Other recent pertinent testing:    RHC 9/12/22  Hemodynamics    RA  --/--/2 mmHg  RV 40/2  mmHg  PA 40/15/25  mmHg  CWP --/--/9 mmHg  CO 2.87 (Tod) L/min  CI 1.91 (Tod) L/min/m2  CO 2.8 (TD) L/min  CI 1.4 (TD) L/min/m2    PA Sat: 51.5%  PVR 5.7 (TD) YEUNG      VASODILATOR TESTING (Dominik 80 PPM)  PA mmHg 39/15/25   CWP --/--/12 mmHg  CO 3.83 (Tod) L/min  CI 1.91 (Tod) L/min/m2  CO 2.8  (TD) L/min  CI 1.4 (TD) L/min/m2    PA Sat: 62.4%  PVR 4.6 YEUNG   Right sided filling pressures are normal. Left sided filling pressures are normal. Mild elevated pulmonary hypertension. Reduced cardiac output level. Hemodynamic data has been modified in Epic per physician review.       Echo 9/8/22  Interpretation Summary  Left ventricular function is normal.The ejection fraction is 55-60%.  Paradoxical septal motion consistent with right ventricular pressure overload  is present.  Mild right ventricular dilation is present. Global right ventricular function  is mildly to moderately reduced.  Mild tricuspid  insufficiency is present.  Pulmonary hypertension is present. Estimated pulmonary artery systolic  pressure is 50 mmHg plus right atrial pressure.  Small inferior vena cava size consistent with hypovolemia.  No pericardial effusion is present.      NYHA Functional Class:  3b      I have reviewed the note as documented above.  This accurately captures the substance of my conversation with the patient.      Phone call contact time  Call Started at 0824  Call Ended at 0834    An additional 15 minutes was spent today performing chart and history review, pre and post visit documentation, review of recent testing, and care coordination.      Geneva Wolfe PA-C  [facility]  Pager [phone]

## 2023-01-01 ENCOUNTER — OFFICE VISIT (OUTPATIENT)
Dept: CARDIOLOGY | Facility: CLINIC | Age: 68
End: 2023-01-01
Attending: PHYSICIAN ASSISTANT
Payer: COMMERCIAL

## 2023-01-01 ENCOUNTER — TELEPHONE (OUTPATIENT)
Dept: CARDIOLOGY | Facility: CLINIC | Age: 68
End: 2023-01-01
Payer: COMMERCIAL

## 2023-01-01 ENCOUNTER — TRANSFERRED RECORDS (OUTPATIENT)
Dept: HEALTH INFORMATION MANAGEMENT | Facility: CLINIC | Age: 68
End: 2023-01-01
Payer: COMMERCIAL

## 2023-01-01 ENCOUNTER — MEDICAL CORRESPONDENCE (OUTPATIENT)
Dept: HEALTH INFORMATION MANAGEMENT | Facility: CLINIC | Age: 68
End: 2023-01-01
Payer: COMMERCIAL

## 2023-01-01 ENCOUNTER — TELEPHONE (OUTPATIENT)
Dept: TRANSPLANT | Facility: CLINIC | Age: 68
End: 2023-01-01

## 2023-01-01 ENCOUNTER — LAB (OUTPATIENT)
Dept: LAB | Facility: CLINIC | Age: 68
End: 2023-01-01
Attending: PHYSICIAN ASSISTANT
Payer: COMMERCIAL

## 2023-01-01 ENCOUNTER — HOSPITAL ENCOUNTER (OUTPATIENT)
Facility: CLINIC | Age: 68
Discharge: HOME | End: 2023-07-12
Attending: INTERNAL MEDICINE | Admitting: INTERNAL MEDICINE
Payer: COMMERCIAL

## 2023-01-01 ENCOUNTER — TELEPHONE (OUTPATIENT)
Dept: PULMONOLOGY | Facility: CLINIC | Age: 68
End: 2023-01-01

## 2023-01-01 ENCOUNTER — TELEPHONE (OUTPATIENT)
Dept: CARDIOLOGY | Facility: CLINIC | Age: 68
End: 2023-01-01

## 2023-01-01 ENCOUNTER — MYC MEDICAL ADVICE (OUTPATIENT)
Dept: CARDIOLOGY | Facility: CLINIC | Age: 68
End: 2023-01-01
Payer: COMMERCIAL

## 2023-01-01 ENCOUNTER — TELEPHONE (OUTPATIENT)
Dept: PULMONOLOGY | Facility: CLINIC | Age: 68
End: 2023-01-01
Payer: COMMERCIAL

## 2023-01-01 ENCOUNTER — TELEPHONE (OUTPATIENT)
Dept: TRANSPLANT | Facility: CLINIC | Age: 68
End: 2023-01-01
Payer: COMMERCIAL

## 2023-01-01 ENCOUNTER — APPOINTMENT (OUTPATIENT)
Dept: MEDSURG UNIT | Facility: CLINIC | Age: 68
End: 2023-01-01
Attending: INTERNAL MEDICINE
Payer: COMMERCIAL

## 2023-01-01 ENCOUNTER — OFFICE VISIT (OUTPATIENT)
Dept: PULMONOLOGY | Facility: CLINIC | Age: 68
End: 2023-01-01
Attending: INTERNAL MEDICINE
Payer: COMMERCIAL

## 2023-01-01 ENCOUNTER — APPOINTMENT (OUTPATIENT)
Dept: LAB | Facility: CLINIC | Age: 68
End: 2023-01-01
Attending: INTERNAL MEDICINE
Payer: COMMERCIAL

## 2023-01-01 ENCOUNTER — HEALTH MAINTENANCE LETTER (OUTPATIENT)
Age: 68
End: 2023-01-01

## 2023-01-01 ENCOUNTER — DOCUMENTATION ONLY (OUTPATIENT)
Dept: OTHER | Facility: CLINIC | Age: 68
End: 2023-01-01
Payer: COMMERCIAL

## 2023-01-01 VITALS
RESPIRATION RATE: 18 BRPM | DIASTOLIC BLOOD PRESSURE: 77 MMHG | HEIGHT: 72 IN | OXYGEN SATURATION: 94 % | TEMPERATURE: 97.1 F | HEART RATE: 77 BPM | SYSTOLIC BLOOD PRESSURE: 107 MMHG | WEIGHT: 173 LBS | BODY MASS INDEX: 23.43 KG/M2

## 2023-01-01 VITALS — DIASTOLIC BLOOD PRESSURE: 66 MMHG | OXYGEN SATURATION: 98 % | SYSTOLIC BLOOD PRESSURE: 95 MMHG | HEART RATE: 94 BPM

## 2023-01-01 VITALS — OXYGEN SATURATION: 100 % | HEART RATE: 77 BPM | SYSTOLIC BLOOD PRESSURE: 100 MMHG | DIASTOLIC BLOOD PRESSURE: 69 MMHG

## 2023-01-01 DIAGNOSIS — I27.20 PULMONARY HYPERTENSION, UNSPECIFIED (H): ICD-10-CM

## 2023-01-01 DIAGNOSIS — R06.09 DOE (DYSPNEA ON EXERTION): ICD-10-CM

## 2023-01-01 DIAGNOSIS — J84.9 ILD (INTERSTITIAL LUNG DISEASE) (H): ICD-10-CM

## 2023-01-01 DIAGNOSIS — J43.9 PULMONARY EMPHYSEMA, UNSPECIFIED EMPHYSEMA TYPE (H): Primary | ICD-10-CM

## 2023-01-01 DIAGNOSIS — I27.21 PAH (PULMONARY ARTERY HYPERTENSION) (H): Primary | ICD-10-CM

## 2023-01-01 DIAGNOSIS — J84.10 PULMONARY FIBROSIS (H): ICD-10-CM

## 2023-01-01 DIAGNOSIS — I27.20 PULMONARY HYPERTENSION (H): Primary | ICD-10-CM

## 2023-01-01 DIAGNOSIS — J84.9 ILD (INTERSTITIAL LUNG DISEASE) (H): Primary | ICD-10-CM

## 2023-01-01 DIAGNOSIS — R06.02 SOB (SHORTNESS OF BREATH): ICD-10-CM

## 2023-01-01 DIAGNOSIS — R60.9 EDEMA: Primary | ICD-10-CM

## 2023-01-01 DIAGNOSIS — I27.21 PAH (PULMONARY ARTERY HYPERTENSION) (H): ICD-10-CM

## 2023-01-01 DIAGNOSIS — J84.10 FIBROTIC LUNG DISEASES (H): ICD-10-CM

## 2023-01-01 DIAGNOSIS — I27.20 PULMONARY HYPERTENSION (H): ICD-10-CM

## 2023-01-01 LAB
ALBUMIN SERPL BCG-MCNC: 4.1 G/DL (ref 3.5–5.2)
ALP SERPL-CCNC: 88 U/L (ref 40–129)
ALT SERPL W P-5'-P-CCNC: 42 U/L (ref 10–50)
ALT SERPL-CCNC: 36 U/L (ref 0–55)
ANION GAP SERPL CALCULATED.3IONS-SCNC: 10 MMOL/L (ref 7–15)
ANION GAP SERPL CALCULATED.3IONS-SCNC: 11 MMOL/L (ref 7–15)
AST SERPL W P-5'-P-CCNC: 35 U/L (ref 10–50)
AST SERPL-CCNC: 31 U/L (ref 5–40)
BASOPHILS # BLD AUTO: 0.1 10E3/UL (ref 0–0.2)
BASOPHILS NFR BLD AUTO: 1 %
BILIRUB SERPL-MCNC: 0.5 MG/DL
BUN SERPL-MCNC: 15.1 MG/DL (ref 8–23)
BUN SERPL-MCNC: 20 MG/DL (ref 8–23)
CALCIUM SERPL-MCNC: 9 MG/DL (ref 8.8–10.2)
CALCIUM SERPL-MCNC: 9 MG/DL (ref 8.8–10.2)
CHLORIDE SERPL-SCNC: 101 MMOL/L (ref 98–107)
CHLORIDE SERPL-SCNC: 104 MMOL/L (ref 98–107)
CREAT SERPL-MCNC: 0.8 MG/DL (ref 0.67–1.17)
CREAT SERPL-MCNC: 0.88 MG/DL (ref 0.67–1.17)
CREATININE (EXTERNAL): 0.8 MG/DL (ref 0.5–1.5)
DEPRECATED HCO3 PLAS-SCNC: 24 MMOL/L (ref 22–29)
DEPRECATED HCO3 PLAS-SCNC: 25 MMOL/L (ref 22–29)
EOSINOPHIL # BLD AUTO: 0.3 10E3/UL (ref 0–0.7)
EOSINOPHIL NFR BLD AUTO: 3 %
ERYTHROCYTE [DISTWIDTH] IN BLOOD BY AUTOMATED COUNT: 13.7 % (ref 10–15)
ERYTHROCYTE [DISTWIDTH] IN BLOOD BY AUTOMATED COUNT: 14.6 % (ref 10–15)
EXPTIME-PRE: 6.34 SEC
FEF2575-%PRED-PRE: 91 %
FEF2575-PRE: 2.46 L/SEC
FEF2575-PRED: 2.69 L/SEC
FEFMAX-%PRED-PRE: 99 %
FEFMAX-PRE: 9.21 L/SEC
FEFMAX-PRED: 9.28 L/SEC
FEV1-%PRED-PRE: 93 %
FEV1-PRE: 3.16 L
FEV1FEV6-PRE: 76 %
FEV1FEV6-PRED: 78 %
FEV1FVC-PRE: 77 %
FEV1FVC-PRED: 77 %
FIFMAX-PRE: 4.23 L/SEC
FVC-%PRED-PRE: 93 %
FVC-PRE: 4.11 L
FVC-PRED: 4.38 L
GFR ESTIMATED (EXTERNAL): >90 ML/MIN/1.73M2
GFR SERPL CREATININE-BSD FRML MDRD: >90 ML/MIN/1.73M2
GFR SERPL CREATININE-BSD FRML MDRD: >90 ML/MIN/1.73M2
GLUCOSE (EXTERNAL): 89 MG/DL (ref 70–180)
GLUCOSE SERPL-MCNC: 93 MG/DL (ref 70–99)
GLUCOSE SERPL-MCNC: 94 MG/DL (ref 70–99)
HCT VFR BLD AUTO: 45.9 % (ref 40–53)
HCT VFR BLD AUTO: 47.3 % (ref 40–53)
HGB BLD-MCNC: 15.2 G/DL (ref 13.3–17.7)
HGB BLD-MCNC: 15.2 G/DL (ref 13.3–17.7)
HGB BLD-MCNC: 15.7 G/DL (ref 13.3–17.7)
IMM GRANULOCYTES # BLD: 0.1 10E3/UL
IMM GRANULOCYTES NFR BLD: 1 %
LVEF ECHO: NORMAL
LYMPHOCYTES # BLD AUTO: 2.1 10E3/UL (ref 0.8–5.3)
LYMPHOCYTES NFR BLD AUTO: 22 %
MCH RBC QN AUTO: 31.7 PG (ref 26.5–33)
MCH RBC QN AUTO: 32.1 PG (ref 26.5–33)
MCHC RBC AUTO-ENTMCNC: 32.1 G/DL (ref 31.5–36.5)
MCHC RBC AUTO-ENTMCNC: 34.2 G/DL (ref 31.5–36.5)
MCV RBC AUTO: 94 FL (ref 78–100)
MCV RBC AUTO: 99 FL (ref 78–100)
MONOCYTES # BLD AUTO: 0.8 10E3/UL (ref 0–1.3)
MONOCYTES NFR BLD AUTO: 8 %
NEUTROPHILS # BLD AUTO: 6.2 10E3/UL (ref 1.6–8.3)
NEUTROPHILS NFR BLD AUTO: 65 %
NRBC # BLD AUTO: 0 10E3/UL
NRBC BLD AUTO-RTO: 0 /100
NT-PROBNP SERPL-MCNC: 389 PG/ML (ref 0–900)
NT-PROBNP SERPL-MCNC: 735 PG/ML (ref 0–900)
OXYHGB MFR BLDV: 50 % (ref 92–100)
PLATELET # BLD AUTO: 275 10E3/UL (ref 150–450)
PLATELET # BLD AUTO: 281 10E3/UL (ref 150–450)
POTASSIUM (EXTERNAL): 3.5 MMOL/L (ref 3.5–5)
POTASSIUM SERPL-SCNC: 3.9 MMOL/L (ref 3.4–5.3)
POTASSIUM SERPL-SCNC: 4.1 MMOL/L (ref 3.4–5.3)
PROT SERPL-MCNC: 7.1 G/DL (ref 6.4–8.3)
RBC # BLD AUTO: 4.79 10E6/UL (ref 4.4–5.9)
RBC # BLD AUTO: 4.89 10E6/UL (ref 4.4–5.9)
SODIUM SERPL-SCNC: 137 MMOL/L (ref 136–145)
SODIUM SERPL-SCNC: 138 MMOL/L (ref 136–145)
WBC # BLD AUTO: 11.4 10E3/UL (ref 4–11)
WBC # BLD AUTO: 9.4 10E3/UL (ref 4–11)

## 2023-01-01 PROCEDURE — 999N000132 HC STATISTIC PP CARE STAGE 1

## 2023-01-01 PROCEDURE — 85018 HEMOGLOBIN: CPT

## 2023-01-01 PROCEDURE — 80053 COMPREHEN METABOLIC PANEL: CPT | Performed by: PATHOLOGY

## 2023-01-01 PROCEDURE — 36415 COLL VENOUS BLD VENIPUNCTURE: CPT | Performed by: PATHOLOGY

## 2023-01-01 PROCEDURE — 94375 RESPIRATORY FLOW VOLUME LOOP: CPT | Performed by: INTERNAL MEDICINE

## 2023-01-01 PROCEDURE — 999N000142 HC STATISTIC PROCEDURE PREP ONLY

## 2023-01-01 PROCEDURE — C1894 INTRO/SHEATH, NON-LASER: HCPCS | Performed by: INTERNAL MEDICINE

## 2023-01-01 PROCEDURE — 36415 COLL VENOUS BLD VENIPUNCTURE: CPT | Performed by: INTERNAL MEDICINE

## 2023-01-01 PROCEDURE — 272N000001 HC OR GENERAL SUPPLY STERILE: Performed by: INTERNAL MEDICINE

## 2023-01-01 PROCEDURE — G0463 HOSPITAL OUTPT CLINIC VISIT: HCPCS | Performed by: INTERNAL MEDICINE

## 2023-01-01 PROCEDURE — 93451 RIGHT HEART CATH: CPT | Performed by: INTERNAL MEDICINE

## 2023-01-01 PROCEDURE — 82810 BLOOD GASES O2 SAT ONLY: CPT

## 2023-01-01 PROCEDURE — 250N000009 HC RX 250: Performed by: INTERNAL MEDICINE

## 2023-01-01 PROCEDURE — 93306 TTE W/DOPPLER COMPLETE: CPT | Performed by: STUDENT IN AN ORGANIZED HEALTH CARE EDUCATION/TRAINING PROGRAM

## 2023-01-01 PROCEDURE — 85025 COMPLETE CBC W/AUTO DIFF WBC: CPT | Performed by: INTERNAL MEDICINE

## 2023-01-01 PROCEDURE — C1751 CATH, INF, PER/CENT/MIDLINE: HCPCS | Performed by: INTERNAL MEDICINE

## 2023-01-01 PROCEDURE — 83880 ASSAY OF NATRIURETIC PEPTIDE: CPT | Performed by: PATHOLOGY

## 2023-01-01 PROCEDURE — 83880 ASSAY OF NATRIURETIC PEPTIDE: CPT | Performed by: INTERNAL MEDICINE

## 2023-01-01 PROCEDURE — 93451 RIGHT HEART CATH: CPT | Mod: 26 | Performed by: INTERNAL MEDICINE

## 2023-01-01 PROCEDURE — 99214 OFFICE O/P EST MOD 30 MIN: CPT | Mod: 25 | Performed by: INTERNAL MEDICINE

## 2023-01-01 PROCEDURE — 80048 BASIC METABOLIC PNL TOTAL CA: CPT | Performed by: INTERNAL MEDICINE

## 2023-01-01 PROCEDURE — 85027 COMPLETE CBC AUTOMATED: CPT | Performed by: PATHOLOGY

## 2023-01-01 PROCEDURE — 94729 DIFFUSING CAPACITY: CPT | Performed by: INTERNAL MEDICINE

## 2023-01-01 PROCEDURE — 99215 OFFICE O/P EST HI 40 MIN: CPT | Mod: 25 | Performed by: INTERNAL MEDICINE

## 2023-01-01 RX ORDER — LIDOCAINE 40 MG/G
CREAM TOPICAL
Status: COMPLETED | OUTPATIENT
Start: 2023-01-01 | End: 2023-01-01

## 2023-01-01 RX ORDER — MORPHINE SULFATE 10 MG/5ML
10 SOLUTION ORAL
COMMUNITY

## 2023-01-01 RX ORDER — SILDENAFIL 25 MG/1
25 TABLET, FILM COATED ORAL 3 TIMES DAILY
Qty: 90 TABLET | Refills: 11 | Status: SHIPPED | OUTPATIENT
Start: 2023-01-01 | End: 2023-01-01 | Stop reason: ALTCHOICE

## 2023-01-01 RX ORDER — CETIRIZINE HYDROCHLORIDE 10 MG/1
10 TABLET ORAL DAILY
COMMUNITY

## 2023-01-01 RX ORDER — DIGOXIN 250 MCG
250 TABLET ORAL DAILY
Qty: 90 TABLET | Refills: 3 | Status: SHIPPED | OUTPATIENT
Start: 2023-01-01

## 2023-01-01 RX ORDER — LIDOCAINE 40 MG/G
CREAM TOPICAL
Status: CANCELLED | OUTPATIENT
Start: 2023-01-01

## 2023-01-01 RX ORDER — SILDENAFIL CITRATE 20 MG/1
20 TABLET ORAL 3 TIMES DAILY
Refills: 11 | COMMUNITY
Start: 2023-01-01 | End: 2023-01-01 | Stop reason: ALTCHOICE

## 2023-01-01 RX ORDER — FUROSEMIDE 40 MG
40 TABLET ORAL DAILY
Qty: 90 TABLET | Refills: 3 | Status: SHIPPED | OUTPATIENT
Start: 2023-01-01

## 2023-01-01 RX ORDER — SILDENAFIL CITRATE 20 MG/1
20 TABLET ORAL 3 TIMES DAILY
Qty: 90 TABLET | Refills: 11 | Status: SHIPPED | OUTPATIENT
Start: 2023-01-01 | End: 2023-01-01 | Stop reason: ALTCHOICE

## 2023-01-01 RX ADMIN — LIDOCAINE: 40 CREAM TOPICAL at 13:09

## 2023-01-01 ASSESSMENT — ACTIVITIES OF DAILY LIVING (ADL)
ADLS_ACUITY_SCORE: 35

## 2023-01-05 NOTE — TELEPHONE ENCOUNTER
Called Edie to check on status and transplant interest. Unable to LVM. [employer] message sent.

## 2023-01-10 NOTE — TELEPHONE ENCOUNTER
Kacie from Palliative care with [facility] reached out in regards to patient's respiratory status. She is inquiring about ordering a life 2000 for the patient. Naomy states pt is currently using 5 liters oxygen at rest and 10-15 lpm with activity. [facility] life 2000. Writer will review with Dr. De La Vega.    Kaia Cline RN

## 2023-02-16 NOTE — NURSING NOTE
Chief Complaint   Patient presents with     Interstitial Lung Disease (ILD)     F/u     Vitals were taken and medications were reconciled.     ELIDA Caldwell

## 2023-02-16 NOTE — LETTER
"    2/16/2023         RE: Deo Wilkinson  [address]  [facility] [address]        Dear Colleague,    Thank you for referring your patient, Deo Wilkinson, to the [facility] FOR LUNG SCIENCE AND [facility]. Please see a copy of my visit note below.    [facility]  Pulmonary Medicine  Visit Clinic Note    Dear patient. Thank you for visiting with me. I want you to feel respected, understood, and empowered. \"Respect\" is valuing you as much as I would a close family member. \"Empowerment\" happens when you are fully informed, and can make the best possible decision for you.  Please ask me questions!  Challenge anything that is not clear.       ASSESSMENT & PLAN     Patient is a 67 year old old male who has been referred to pulmonary clinic for further management of CPFE/ UIP     #Pulmonary fibrosis most likely CPFE. With possible UIP patten  #Severe Hypoxemic respiratory failure   #Group III Pulmonary Hypertension   #Dyspnea on  Exertion   -Patient has had severe fibrosis which can explain  His hypoxemia and shortness of breath.   - His reduction in diffusion capacity is significantly increased compared to his spirometry numbers.  -He is not able to tolerate ofev well. Dropped his dose of ofev to 100 BID.  He is tolerating it will.   - He has been started on Tyvaso by Cardiology.  - Has been using vest therapy.  Normal Bicarb levels   -He has limited interest in transplant for now..  Discussed [id number].   Wrote for it.     -He has progressive disease and without transplant will have poor prognosis.  Will address goals of care in next visit.     #Positive SSA and mildly increased ESR  -No prednisone treatment is required on this patient.  As no groundglass opacity was noted.  Mainly lung fibrosis was noted.  - Patient has been recommended to see a rheumatology.  Patient will see rheumatology in [facility].    -His case was discussed in the ILD conference.  " Assessment and plan as above.      Vaccine:   - Has had covid vaccine 3.   - Is up to date on pneumococcal vaccine.     RTC in 9 months PFTs.     25 minutes excluding the time spent on cigarette cessation was  spent on the date of the encounter doing chart review, history and exam, documentation and further activities as noted above.    These conclusions are made at the best of one's knowledge and belief based on the provided evidence such as patient's history and allergy test results and they can change over time or can be incomplete because of missing information's.    I explained the lab values, imagings and findings to the patient.  Patient expressed understanding I did not recognize any barriers to the understanding of the patient.    The above note was dictated using voice recognition software and may include typographical errors. Please contact the author for any clarifications.    Dane De La Vega MD   RN Coordinators: Jose/Maria Dolores: [phone]  ILD RN Coordinators: [phone]  Clinic Number: [phone]  Pager: [phone]       Today's visit note:     Chief Complaint: Deo Wilkinson is a 67 year old year old male who is being seen for No chief complaint on file.      HPI:   Patient is a 67 year old male who has been referred to pulmonary clinic for further work-up of his dyspnea on exertion.  Patient has been followed by pulmonary nurse practitioner in [facility].  He has been treated with inhalers.  He was noted to have lung fibrosis and worsening dyspnea.    -He has had 2 recent hospitalization in November as well as December and since then his shortness of breath has continued to worsen.  He was referred to pulmonary clinic here for further work-up.    - No dizziness or lightheadedness is noted.     #9/8/22  - Patient has limited functional capacity.     ILD exposure questions:  Occupation: Retired. Worked in trucks , construction. Also worked n [employer]. Was exposed to dust.  2 years ago.    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   Pet bird: a dog.    Hot tub: No   Portable humidifier: Yes. Keeps it clean  Natural gas   Smoking tobacco or marijuana: 40 years. Stopepd smoking a month ago.  Half pakc per day  Hobbies: Hunting. [place].   Chemotherapy or radiation therapy: None   Fam hx of ILD: Lung cancer/ smoking.  None      Medical History:   - History of CAD. 2 stents.  2005 2/2023:  -   Slowly worsening.   -  On tyvaso and trelegy. Will get life 2000.   -   Not interested in transplant.          Medications:     Current Outpatient Medications   Medication     acetaminophen (TYLENOL) 500 MG tablet     albuterol (PROVENTIL) (2.5 MG/3ML) 0.083% neb solution     aspirin (ASA) 81 MG chewable tablet     atorvastatin (LIPITOR) 20 MG tablet     FLUoxetine (PROZAC) 20 MG capsule     Fluticasone-Umeclidin-Vilanterol (TRELEGY ELLIPTA) 100-62.5-25 MCG/INH oral inhaler     furosemide (LASIX) 40 MG tablet     loratadine (CLARITIN) 10 MG tablet     MULTIPLE VITAMIN PO     nintedanib (OFEV) 100 MG capsule     Treprostinil (TYVASO DPI MAINTENANCE KIT) 64 MCG POWD     vitamin (B COMPLEX) capsule     Vitamin D, Cholecalciferol, 10 MCG (400 UNIT) TABS     No current facility-administered medications for this visit.            Review of Systems:       A complete 10 point review of systems was otherwise negative except as noted in the HPI.        PHYSICAL EXAM:  There were no vitals taken for this visit.     General: Well developed, well nourished, No apparent distress  Eyes: Anicteric  Nose: Nasal mucosa with no edema or hyperemia.  No polyps  Ears: Hearing grossly normal  Mouth: Oral mucosa is moist, without any lesions. No oropharyngeal exudate.  Respiratory: Poor air entry is noted.  Cardiac: RRR, normal S1, S2. No murmurs. No JVD  Abdomen: Soft, NT/ND  Musculoskeletal: Extremities normal. No clubbing. No cyanosis. No edema.  Skin: No rash on limited exam  Neuro: Normal mentation. Normal speech.  Psych:Normal affect            Data:   All laboratory and imaging data reviewed.      PFT:         6/2022            PFT Interpretation:  I personally reviewed and interpreted the PFTs.    ECHo: 3/2022  The estimated ejection fraction is 55-60%.     * Left ventricular segmental wall motion is normal.     * The right ventricle is mildly enlarged.     * Mildly reduced right ventricular systolic function.     * The left atrium is normal in size.     * There is no aortic stenosis with a peak velocity of  96 cm/s.     * There is moderate tricuspid regurgitation.     * Moderate to severe pulmonary hypertension, estimated pulmonary arterial   systolic pressure is  55 mmHg.     * The IVC is normal in size (< 2.1 cm), > 50% respiratory variance, RA   pressure normal at 3 mmHg.     * There is no pericardial effusion visualized.       CXR: I personally reviewed and interpreted the chest x-ray    Chest CT: I personally reviewed and interpreted the CT scan.    2020 6/2022 6/2022 9/2022      Recent Results (from the past 168 hour(s))   General PFT Lab (Please always keep checked)    Collection Time: 02/16/23  8:10 AM   Result Value Ref Range    FVC-Pred 4.38 L    FVC-Pre 4.11 L    FVC-%Pred-Pre 93 %    FEV1-Pre 3.16 L    FEV1-%Pred-Pre 93 %    FEV1FVC-Pred 77 %    FEV1FVC-Pre 77 %    FEFMax-Pred 9.28 L/sec    FEFMax-Pre 9.21 L/sec    FEFMax-%Pred-Pre 99 %    FEF2575-Pred 2.69 L/sec    FEF2575-Pre 2.46 L/sec    TNI4101-%Pred-Pre 91 %    ExpTime-Pre 6.34 sec    FIFMax-Pre 4.23 L/sec    FEV1FEV6-Pred 78 %    FEV1FEV6-Pre 76 %               Again, thank you for allowing me to participate in the care of your patient.        Sincerely,        Dane De La Vega MD

## 2023-02-16 NOTE — PROGRESS NOTES
"[facility]  Pulmonary Medicine  Visit Clinic Note    Dear patient. Thank you for visiting with me. I want you to feel respected, understood, and empowered. \"Respect\" is valuing you as much as I would a close family member. \"Empowerment\" happens when you are fully informed, and can make the best possible decision for you.  Please ask me questions!  Challenge anything that is not clear.       ASSESSMENT & PLAN     Patient is a 67 year old old male who has been referred to pulmonary clinic for further management of CPFE/ UIP     #Pulmonary fibrosis most likely CPFE. With possible UIP patten  #Severe Hypoxemic respiratory failure   #Group III Pulmonary Hypertension   #Dyspnea on  Exertion   -Patient has had severe fibrosis which can explain  His hypoxemia and shortness of breath.   - His reduction in diffusion capacity is significantly increased compared to his spirometry numbers.  -He is not able to tolerate ofev well. Dropped his dose of ofev to 100 BID.  He is tolerating it will.   - He has been started on Tyvaso by Cardiology.  - Has been using vest therapy.  Normal Bicarb levels   -He has limited interest in transplant for now..  Discussed [id number].   Wrote for it.     -He has progressive disease and without transplant will have poor prognosis.  Will address goals of care in next visit.     #Positive SSA and mildly increased ESR  -No prednisone treatment is required on this patient.  As no groundglass opacity was noted.  Mainly lung fibrosis was noted.  - Patient has been recommended to see a rheumatology.  Patient will see rheumatology in [facility].    -His case was discussed in the ILD conference.  Assessment and plan as above.      Vaccine:   - Has had covid vaccine 3.   - Is up to date on pneumococcal vaccine.     RTC in 9 months PFTs.     25 minutes excluding the time spent on cigarette cessation was  spent on the date of the encounter doing chart review, history and exam, " documentation and further activities as noted above.    These conclusions are made at the best of one's knowledge and belief based on the provided evidence such as patient's history and allergy test results and they can change over time or can be incomplete because of missing information's.    I explained the lab values, imagings and findings to the patient.  Patient expressed understanding I did not recognize any barriers to the understanding of the patient.    The above note was dictated using voice recognition software and may include typographical errors. Please contact the author for any clarifications.    Dane De La Vega MD   RN Coordinators: Baltazar/Kennedy/Maria Dolores: [phone]  ILD RN Coordinators: [phone]  Clinic Number: [phone]  Pager: [phone]       Today's visit note:     Chief Complaint: Deo Wilkinson is a 67 year old year old male who is being seen for No chief complaint on file.      HPI:   Patient is a 67 year old male who has been referred to pulmonary clinic for further work-up of his dyspnea on exertion.  Patient has been followed by pulmonary nurse practitioner in [facility].  He has been treated with inhalers.  He was noted to have lung fibrosis and worsening dyspnea.    -He has had 2 recent hospitalization in November as well as December and since then his shortness of breath has continued to worsen.  He was referred to pulmonary clinic here for further work-up.    - No dizziness or lightheadedness is noted.     #9/8/22  - Patient has limited functional capacity.     ILD exposure questions:  Occupation: Retired. 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 Was exposed to dust.  2 years ago.   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   Pet bird: a dog.    Hot tub: No   Portable humidifier: Yes. Keeps it clean  [employer] gas   Smoking tobacco or marijuana: 40 years. Stopepd smoking a month ago.  Half pakc per day  Hobbies: Hunting. [place].   Chemotherapy or radiation therapy: None    Fam hx of ILD: Lung cancer/ smoking.  None      Medical History:   - History of CAD. 2 stents.  2005 2/2023:  -   Slowly worsening.   -  On tyvaso and trelegy. Will get life 2000.   -   Not interested in transplant.          Medications:     Current Outpatient Medications   Medication     acetaminophen (TYLENOL) 500 MG tablet     albuterol (PROVENTIL) (2.5 MG/3ML) 0.083% neb solution     aspirin (ASA) 81 MG chewable tablet     atorvastatin (LIPITOR) 20 MG tablet     FLUoxetine (PROZAC) 20 MG capsule     Fluticasone-Umeclidin-Vilanterol (TRELEGY ELLIPTA) 100-62.5-25 MCG/INH oral inhaler     furosemide (LASIX) 40 MG tablet     loratadine (CLARITIN) 10 MG tablet     MULTIPLE VITAMIN PO     nintedanib (OFEV) 100 MG capsule     Treprostinil (TYVASO DPI MAINTENANCE KIT) 64 MCG POWD     vitamin (B COMPLEX) capsule     Vitamin D, Cholecalciferol, 10 MCG (400 UNIT) TABS     No current facility-administered medications for this visit.            Review of Systems:       A complete 10 point review of systems was otherwise negative except as noted in the HPI.        PHYSICAL EXAM:  There were no vitals taken for this visit.     General: Well developed, well nourished, No apparent distress  Eyes: Anicteric  Nose: Nasal mucosa with no edema or hyperemia.  No polyps  Ears: Hearing grossly normal  Mouth: Oral mucosa is moist, without any lesions. No oropharyngeal exudate.  Respiratory: Poor air entry is noted.  Cardiac: RRR, normal S1, S2. No murmurs. No JVD  Abdomen: Soft, NT/ND  Musculoskeletal: Extremities normal. No clubbing. No cyanosis. No edema.  Skin: No rash on limited exam  Neuro: Normal mentation. Normal speech.  Psych:Normal affect           Data:   All laboratory and imaging data reviewed.      PFT:         6/2022            PFT Interpretation:  I personally reviewed and interpreted the PFTs.    ECHo: 3/2022  The estimated ejection fraction is 55-60%.     * Left ventricular segmental wall motion is normal.     *  The right ventricle is mildly enlarged.     * Mildly reduced right ventricular systolic function.     * The left atrium is normal in size.     * There is no aortic stenosis with a peak velocity of  96 cm/s.     * There is moderate tricuspid regurgitation.     * Moderate to severe pulmonary hypertension, estimated pulmonary arterial   systolic pressure is  55 mmHg.     * The IVC is normal in size (< 2.1 cm), > 50% respiratory variance, RA   pressure normal at 3 mmHg.     * There is no pericardial effusion visualized.       CXR: I personally reviewed and interpreted the chest x-ray    Chest CT: I personally reviewed and interpreted the CT scan.    2020 6/2022 6/2022 9/2022      Recent Results (from the past 168 hour(s))   General PFT Lab (Please always keep checked)    Collection Time: 02/16/23  8:10 AM   Result Value Ref Range    FVC-Pred 4.38 L    FVC-Pre 4.11 L    FVC-%Pred-Pre 93 %    FEV1-Pre 3.16 L    FEV1-%Pred-Pre 93 %    FEV1FVC-Pred 77 %    FEV1FVC-Pre 77 %    FEFMax-Pred 9.28 L/sec    FEFMax-Pre 9.21 L/sec    FEFMax-%Pred-Pre 99 %    FEF2575-Pred 2.69 L/sec    FEF2575-Pre 2.46 L/sec    [id number]-%Pred-Pre 91 %    ExpTime-Pre 6.34 sec    FIFMax-Pre 4.23 L/sec    FEV1FEV6-Pred 78 %    FEV1FEV6-Pre 76 %                                        Denis

## 2023-02-20 NOTE — TELEPHONE ENCOUNTER
Called patient to follow-up on s/s of headaches on Tyvaso DPI. Dr. De La Vega updated team that patient has had ongoing headaches.     Patient reports he has always had headaches after he up- titrated to 64mcg. Inquired about Tylenol for headache. Patient has not tried this. Educated to trial tylenol with dosing or twice daily to see if this improves s/s. Requested a call back by 2/24 for follow-up . Patient agreeable to this plan.   Angie Peters RN on 2/20/2023 at 2:51 PM

## 2023-02-21 NOTE — TELEPHONE ENCOUNTER
Patient Contacted for the patient to call back and schedule the following:    Appointment type: ALLIE  Provider: HANS  Return date: 11/16/23  Specialty phone number: [phone]  Additional appointment(s) needed: FPFT  Additonal Notes: NA

## 2023-02-24 NOTE — TELEPHONE ENCOUNTER
Tylenol is helping with PERKINS per patient. Will continue with tylenol BID with Tyvaso. Patient would appreciate a call from nurse next week to check in.     Angie Peters RN on 2/24/2023 at 12:36 PM

## 2023-03-03 NOTE — TELEPHONE ENCOUNTER
Patient reports continued used of PRN tylenol is helping headaches. Does not feel any changes need to be made at this time. Patient will reach out for any concerns    Angie Peters RN on 3/3/2023 at 9:58 AM

## 2023-03-13 NOTE — PROGRESS NOTES
Service Date: 2023    Dane De La Vega MD  [facility]  [address] [place] [id number]    RE:  Deo Wilkinson   MRN:  [id number]  :  1955      Dear Dr. De La Vega:       We had the pleasure of seeing Mr. Deo Wilkinson in our Pulmonary Hypertension Clinic at the [facility].  Although you are familiar with his history, please allow me to summarize it for the purpose of our records.     Mr. Wilkinson is a very delightful 67-year-old male with a past medical history significant for combined pulmonary fibrosis and emphysema.  He was diagnosed with CPFE approximately 4-5 years ago.  He was followed by Pulmonary Medicine at [facility].  He was doing reasonably okay except for a gradual decline in exertional shortness of breath up until 2020, when he was admitted with pneumonia.  Since then, he has had a rapid downhill course with significant decrease in his exercise capacity and worsening exertional shortness of breath.  He has had 3 hospitalizations for recurrent pneumonias.  He has been on supplemental oxygen since 2021.  He is currently using 15 liters with exertion and 5-8 liters at rest.  He is quite limited.  He is limited even with activities of daily living.  I would currently characterize him as functional class IIIB.  He has had lower extremity swelling for which he was started on diuretics in the spring of last year.       He was referred to our ILD clinic at the [place].  He was seen by Dr. De La Vega in 2022.  He has been on antifibrotic therapy, nintedanib.  He had an echocardiogram in 2022.  This showed severely elevated right ventricular systolic pressure at 50 mmHg.  His right ventricle was mildly dilated with mild to moderately reduced function.  His IVC was normal.  He had trivial pericardial effusion.  He had mild tricuspid regurgitation.  His left ventricle was small in size with normal function.  His left atrium was  normal.     Given his pulmonary hypertension in the setting of CPFE, he was started on Tyvaso. He has not noticed any difference in his breathing since starting Tyvaso and over time, has had a slow gradual worsening in his respiratory status, requiring more supplemental oxygen. He has a nonproductive cough for ~20 minutes after each Tyvaso dose along with a headache. Denies chest pain, lower extremity edema, abdominal distension, or presyncope. He was seen in the ED in [place] for a syncopal event on 22 after running out of oxygen. Has not had any subsequent syncopal events. He was last seen by Dr. De La Vega last month. During that visit, he was prescribed a [id number] (wearable noninvasive ventilator). Patient continues to decline evaluation for lung transplant.      PAST MEDICAL HISTORY:  1.  Coronary artery disease, acute coronary syndrome, status post coronary intervention in .  2.  CPFE.  2.  Pulmonary hypertension.    PAST SURGICAL HISTORY:  Past Surgical History:   Procedure Laterality Date     BACK SURGERY       CARDIAC SURGERY       COLONOSCOPY       CV RIGHT HEART CATH MEASUREMENTS RECORDED N/A 2022     CV RIGHT HEART CATH PULMONARY VASODILATOR STUDY N/A 2022     GI SURGERY       ORTHOPEDIC SURGERY         FAMILY HISTORY:  No family history on file.    SOCIAL HISTORY:  Social History     Socioeconomic History     Marital status:      Spouse name: Not on file     Number of children: Not on file     Years of education: Not on file     Highest education level: Not on file   Occupational History     Not on file   Tobacco Use     Smoking status: Some Days     Packs/day: 0.50     Years: 40.00     Pack years: 20.00     Types: Cigarettes     Last attempt to quit: 2021     Years since quittin.3     Smokeless tobacco: Never   Substance and Sexual Activity     Alcohol use: Yes     Comment: 5-8 beers on weekends     Drug use: Never     Sexual activity: Not on file   Other Topics Concern      Not on file   Social History Narrative     Not on file     Social Determinants of Health     Financial Resource Strain: Not on file   Food Insecurity: Not on file   Transportation Needs: Not on file   Physical Activity: Not on file   Stress: Not on file   Social Connections: Not on file   Intimate Partner Violence: Not on file   Housing Stability: Not on file       CURRENT MEDICATIONS:  Current Outpatient Medications   Medication Sig     acetaminophen (TYLENOL) 500 MG tablet Take 500-1,000 mg by mouth     albuterol (PROVENTIL) (2.5 MG/3ML) 0.083% neb solution Inhale 2.5 mg into the lungs daily     aspirin (ASA) 81 MG chewable tablet Take 81 mg by mouth daily     atorvastatin (LIPITOR) 20 MG tablet Take 10 mg by mouth daily     FLUoxetine (PROZAC) 20 MG capsule Take 40 mg by mouth daily     Fluticasone-Umeclidin-Vilanterol (TRELEGY ELLIPTA) 100-62.5-25 MCG/INH oral inhaler      furosemide (LASIX) 40 MG tablet Take 40 mg by mouth daily     loratadine (CLARITIN) 10 MG tablet Take 1 tablet by mouth daily     MULTIPLE VITAMIN PO Take 1 tablet by mouth daily     nintedanib (OFEV) 100 MG capsule Take 1 capsule (100 mg) by mouth 2 times daily for 240 days (Patient taking differently: Take 150 mg by mouth daily)     sildenafil (VIAGRA) 25 MG tablet Take 1 tablet (25 mg) by mouth 3 times daily     Treprostinil (TYVASO DPI MAINTENANCE KIT) 64 MCG POWD Inhale 64 mcg into the lungs 4 times daily     vitamin (B COMPLEX) capsule Take 1 capsule by mouth daily     Vitamin D, Cholecalciferol, 10 MCG (400 UNIT) TABS Take 1 capsule by mouth daily     No current facility-administered medications for this visit.       ROS:   10 point ROS negative except as discussed in above HPI.    EXAM:  /69 (BP Location: Left arm, Patient Position: Sitting, Cuff Size: Adult Regular)   Pulse 77   SpO2 100%   General: appears comfortable, alert and articulate, sitting in a wheelchair  Head: normocephalic, atraumatic  Eyes: anicteric sclera,  EOMI  Heart: regular, normal S1/S2, no murmur, gallop, rub, estimated JVP 6 cm, 1+ carotid and radial pulses  Lungs: diffuse crackles and diminished breath sounds, no wheezing, on supplemental oxygen  Abdomen: soft, non-tender, bowel sounds present, no hepatosplenomegaly  Extremities: no clubbing, cyanosis or edema  Neurological: normal speech and affect, no gross motor deficits    Labs:  Recent Results (from the past 24 hour(s))   CBC with platelets    Collection Time: 03/13/23  7:31 AM   Result Value Ref Range    WBC Count 11.4 (H) 4.0 - 11.0 10e3/uL    RBC Count 4.89 4.40 - 5.90 10e6/uL    Hemoglobin 15.7 13.3 - 17.7 g/dL    Hematocrit 45.9 40.0 - 53.0 %    MCV 94 78 - 100 fL    MCH 32.1 26.5 - 33.0 pg    MCHC 34.2 31.5 - 36.5 g/dL    RDW 13.7 10.0 - 15.0 %    Platelet Count 275 150 - 450 10e3/uL   Comprehensive metabolic panel    Collection Time: 03/13/23  7:31 AM   Result Value Ref Range    Sodium 137 136 - 145 mmol/L    Potassium 3.9 3.4 - 5.3 mmol/L    Chloride 101 98 - 107 mmol/L    Carbon Dioxide (CO2) 25 22 - 29 mmol/L    Anion Gap 11 7 - 15 mmol/L    Urea Nitrogen 20.0 8.0 - 23.0 mg/dL    Creatinine 0.80 0.67 - 1.17 mg/dL    Calcium 9.0 8.8 - 10.2 mg/dL    Glucose 93 70 - 99 mg/dL    Alkaline Phosphatase 88 40 - 129 U/L    AST 35 10 - 50 U/L    ALT 42 10 - 50 U/L    Protein Total 7.1 6.4 - 8.3 g/dL    Albumin 4.1 3.5 - 5.2 g/dL    Bilirubin Total 0.5 <=1.2 mg/dL    GFR Estimate >90 >60 mL/min/1.73m2   N terminal pro BNP outpatient    Collection Time: 03/13/23  7:31 AM   Result Value Ref Range    N Terminal Pro BNP Outpatient 735 0 - 900 pg/mL   Echocardiogram Complete    Collection Time: 03/13/23  8:52 AM   Result Value Ref Range    LVEF  60-65%          Echocardiogram 3/13/23:  Global and regional left ventricular function is normal with an EF of 60-65%.  Left ventricular cavity size is small.  Global right ventricular function is mildly reduced. RV S' 9 cm/s. The  endocardial borders are not visualized  clearly enough to perform RVFAC  measurements. Mild right ventricular dilation is present.  Mild TR.  Pulmonary artery systolic pressure cannot be assessed. The PA acceleration  time could not be obtained.  IVC diameter <2.1 cm collapsing >50% with sniff suggests a normal RA pressure  of 3 mmHg.  Trivial pericardial effusion is present. Chamber compression is not present;  there is no evidence for tamponade.  This study was compared with the study from 09/08/2022. No significant changes  noted. The trivial pericardial effusion is newly noted.    PFTs 2/16/23:  FVC: 93%  FEV1: 93%  FEV1/FVC: 77%  Deferred obtaining DLCO as patient could not hold his breath    RHC 9/12/22:  RA: 2  RV: 40/2  PA: 40/15 (25)  PCWP: 9  TD CO/CI: 2.8/1.4  Tod CO/CI: 2.9/1.4  PVR: 5.6    With Dominik 80 ppm:  PA: 39/15 (25)  PCWP 12  TD CO/CI: 2.8/1.4  Tod CO/CI: 3.8/1.9  PVR: 4.6    6MWT 6/16/22: Ambulated 140 meters, required O2 to be increased to 10 L/min and lowest saturation was 85% on 10 L/min      Assessment and Plan:     In summary, Mr. Deo Wilkinson is a very pleasant 67-year-old male with a past medical history significant for combined pulmonary fibrosis and emphysema, who presents for ongoing management of pulmonary hypertension secondary to combined pulmonary fibrosis and emphysema.     He is currently on Tyvaso 64 mcg four times a day without any improvement in symptoms. He has required additional supplemental oxygen and is limited in his activities. He is on 15 liters with exertion and 5-8 liters of supplemental oxygen at rest. We would categorize him as NYHA functional class IIIB.  On most recent echocardiogram in September 2022, his right ventricle was mildly dilated with mild to moderately reduced function. He is not in decompensated RV failure. We discussed obtaining repeat invasive hemodynamic measurements before starting any new therapies, which he does not want to pursue at this time. We will start sildenafil 25 mg three  times a day [facility] and repeat right heart catheterization with Dr. Martin, echocardiogram, and labs 3-4 months after starting sildenafil. We advised him to monitor for possible worsening hypoxemia with the initiation of sildenafil. He continues to decline lung transplant evaluation today. His overall prognosis is poor, which he is aware of.    Follow-up with RIGO Aceves 1 month after starting sildenafil.    It was a pleasure seeing Deo Wilkinson at the [facility] Pulmonary [facility]pertension [facility]. Please contact us with any questions or concerns that you may have.      Patient was seen and discussed with staff attending, Dr. Martin.      Sincerely,      Nicolasa Kennedy MD, PhD  Cardiology Fellow    I examined the patient and agree with the assessment and plan of Dr. Kennedy    Total time today was 44 minutes reviewing notes, imaging, labs, patient visit, orders and documentation         Veronica Martin MD   Center for Pulmonary Hypertension  Heart Failure, Transplant, and Mechanical Circulatory Support Cardiology   Cardiovascular Division  [facility]   [phone]

## 2023-03-13 NOTE — PATIENT INSTRUCTIONS
Medication Changes:   -Begin Sildenafil 25mg three times daily.  [facility].    Patient Instructions:  1. Continue staying active and eat a heart healthy diet.    2. Please keep current list of medications with you at all times.    3. Remember to weigh yourself daily after voiding and before you consume any food or beverages and log the numbers.  If you have gained 2 pounds overnight or 5 pounds in a week contact us immediately for medication adjustments or further instructions.    4. **Please call us immediately if you have any syncope (fainting or passing out), chest pain, edema (swelling or weight gain), or decline in your functional status (general decline in how you are feeling).    5. Patients on Remodulin (treprostinil) or Veletri (epoprostenol): Please make sure that you have your backup pump and supplies with you at all times, your mixing instructions, and contact information for your specialty pharmacy.    Follow up Appointment Information:  -follow up virtually 1 month after starting the Sildenafil  -June/July return for RHC w/Dr. Martin with [facility] as well    Results:  Component      Latest Ref Rng & Units 3/13/2023   Sodium      136 - 145 mmol/L 137   Potassium      3.4 - 5.3 mmol/L 3.9   Chloride      98 - 107 mmol/L 101   Carbon Dioxide (CO2)      22 - 29 mmol/L 25   Anion Gap      7 - 15 mmol/L 11   Urea Nitrogen      8.0 - 23.0 mg/dL 20.0   Creatinine      0.67 - 1.17 mg/dL 0.80   Calcium      8.8 - 10.2 mg/dL 9.0   Glucose      70 - 99 mg/dL 93   Alkaline Phosphatase      40 - 129 U/L 88   AST      10 - 50 U/L 35   ALT      10 - 50 U/L 42   Protein Total      6.4 - 8.3 g/dL 7.1   Albumin      3.5 - 5.2 g/dL 4.1   Bilirubin Total      <=1.2 mg/dL 0.5   GFR Estimate      >60 mL/min/1.73m2 >90   WBC      4.0 - 11.0 10e3/uL 11.4 (H)   RBC Count      4.40 - 5.90 10e6/uL 4.89   Hemoglobin      13.3 - 17.7 g/dL 15.7   Hematocrit      40.0 - 53.0 % 45.9    MCV      78 - 100 fL 94   MCH      26.5 - 33.0 pg 32.1   MCHC      31.5 - 36.5 g/dL 34.2   RDW      10.0 - 15.0 % 13.7   Platelet Count      150 - 450 10e3/uL 275   N-Terminal Pro Bnp      0 - 900 pg/mL 735     We are located on the third floor of the [facility] ([facility] on the [facility].  Our address is     [address] [place] [address] on [address] [place] [address]      Thank you for allowing us to be a part of your care here at the [facility]    If you have questions or concerns please contact us at:    Angie Peters RN, BSN      Nurse Coordinator         Pulmonary Hypertension       [facility]     (Phone)[phone]         PANCHO Pedraza   (Prior Authorizations)   ()  Clinic   Clinic   Pulmonary Hypertension   Pulmonary Hypertension  [facility]  (P)[phone]    (P) [phone]  (F) [phone]          ** Please note that you will NOT receive a reminder call regarding your scheduled testing, reminder calls are for provider appointments only.  If you are scheduled for testing within the [employer] system you may receive a call regarding pre-registration for billing purposes only.**     Support Group:  Pulmonary Hypertension Association  Https://www.phassociation.org/  **Look at the Events Tab** They even have Support Groups that you can call into    [facility] PH Support Group  Second Saturday of the Month from 1-3 PM   Location: [address] [place] [address]  Leader: Nimo Beltre  Phone: [phone]  Email: [email or web address]     Great Videos about Pulmonary Hypertension!!  Scan ME!    Website: [email or web address]

## 2023-03-13 NOTE — LETTER
3/13/2023      RE: Deo Wilkinson  [address] [facility] [address]       Dear Colleague,    Thank you for the opportunity to participate in the care of your patient, Deo Wilkinson, at the [facility] HEART [facility]. Please see a copy of my visit note below.    Deo Wilkinson's goals for this visit include: Following up.     He requests these members of his care team be copied on today's visit information: PCP    PCP: William Schaefer    Referring Provider:  RIGO Aceves  [facility] HEART CARE  [address] [place] [address]    /69 (BP Location: Left arm, Patient Position: Sitting, Cuff Size: Adult Regular)   Pulse 77   SpO2 100%     Do you need any medication refills at today's visit? No.    Abad Ulrich, EMT  Clinic Support  [facility]    [phone]    Employed by [facility] [employer]          Service Date: 2023    Dane De La Vega MD  [facility]  [address] [place] [address]    RE:  Deo Wilkinson   MRN:  [id number]  :  1955      Dear Dr. De La Vega:       We had the pleasure of seeing Mr. Deo Wilkinson in our Pulmonary Hypertension Clinic at the [facility].  Although you are familiar with his history, please allow me to summarize it for the purpose of our records.     Mr. Wilkinson is a very delightful 67-year-old male with a past medical history significant for combined pulmonary fibrosis and emphysema.  He was diagnosed with CPFE approximately 4-5 years ago.  He was followed by Pulmonary Medicine at [facility].  He was doing reasonably okay except for a gradual decline in exertional shortness of breath up until 2020, when he was admitted with pneumonia.  Since then, he has had a rapid downhill course with significant decrease in his exercise capacity and worsening exertional shortness of breath.  He has had 3  hospitalizations for recurrent pneumonias.  He has been on supplemental oxygen since 11/2021.  He is currently using 15 liters with exertion and 5-8 liters at rest.  He is quite limited.  He is limited even with activities of daily living.  I would currently characterize him as functional class IIIB.  He has had lower extremity swelling for which he was started on diuretics in the spring of last year.       He was referred to our ILD clinic at the [facility].  He was seen by Dr. De La Vega in 06/2022.  He has been on antifibrotic therapy, nintedanib.  He had an echocardiogram in 09/2022.  This showed severely elevated right ventricular systolic pressure at 50 mmHg.  His right ventricle was mildly dilated with mild to moderately reduced function.  His IVC was normal.  He had trivial pericardial effusion.  He had mild tricuspid regurgitation.  His left ventricle was small in size with normal function.  His left atrium was normal.     Given his pulmonary hypertension in the setting of CPFE, he was started on Tyvaso. He has not noticed any difference in his breathing since starting Tyvaso and over time, has had a slow gradual worsening in his respiratory status, requiring more supplemental oxygen. He has a nonproductive cough for ~20 minutes after each Tyvaso dose along with a headache. Denies chest pain, lower extremity edema, abdominal distension, or presyncope. He was seen in the ED in [place] for a syncopal event on 12/9/22 after running out of oxygen. Has not had any subsequent syncopal events. He was last seen by Dr. De La Vega last month. During that visit, he was prescribed a [id number] (wearable noninvasive ventilator). Patient continues to decline evaluation for lung transplant.      PAST MEDICAL HISTORY:  1.  Coronary artery disease, acute coronary syndrome, status post coronary intervention in 2004.  2.  CPFE.  2.  Pulmonary hypertension.    PAST SURGICAL HISTORY:  Past Surgical History:   Procedure Laterality Date      BACK SURGERY       CARDIAC SURGERY       COLONOSCOPY       CV RIGHT HEART CATH MEASUREMENTS RECORDED N/A 2022     CV RIGHT HEART CATH PULMONARY VASODILATOR STUDY N/A 2022     GI SURGERY       ORTHOPEDIC SURGERY         FAMILY HISTORY:  No family history on file.    SOCIAL HISTORY:  Social History     Socioeconomic History     Marital status:      Spouse name: Not on file     Number of children: Not on file     Years of education: Not on file     Highest education level: Not on file   Occupational History     Not on file   Tobacco Use     Smoking status: Some Days     Packs/day: 0.50     Years: 40.00     Pack years: 20.00     Types: Cigarettes     Last attempt to quit: 2021     Years since quittin.3     Smokeless tobacco: Never   Substance and Sexual Activity     Alcohol use: Yes     Comment: 5-8 beers on weekends     Drug use: Never     Sexual activity: Not on file   Other Topics Concern     Not on file   Social History Narrative     Not on file     Social Determinants of Health     Financial Resource Strain: Not on file   Food Insecurity: Not on file   Transportation Needs: Not on file   Physical Activity: Not on file   Stress: Not on file   Social Connections: Not on file   Intimate Partner Violence: Not on file   Housing Stability: Not on file       CURRENT MEDICATIONS:  Current Outpatient Medications   Medication Sig     acetaminophen (TYLENOL) 500 MG tablet Take 500-1,000 mg by mouth     albuterol (PROVENTIL) (2.5 MG/3ML) 0.083% neb solution Inhale 2.5 mg into the lungs daily     aspirin (ASA) 81 MG chewable tablet Take 81 mg by mouth daily     atorvastatin (LIPITOR) 20 MG tablet Take 10 mg by mouth daily     FLUoxetine (PROZAC) 20 MG capsule Take 40 mg by mouth daily     Fluticasone-Umeclidin-Vilanterol (TRELEGY ELLIPTA) 100-62.5-25 MCG/INH oral inhaler      furosemide (LASIX) 40 MG tablet Take 40 mg by mouth daily     loratadine (CLARITIN) 10 MG tablet Take 1 tablet by mouth daily      MULTIPLE VITAMIN PO Take 1 tablet by mouth daily     nintedanib (OFEV) 100 MG capsule Take 1 capsule (100 mg) by mouth 2 times daily for 240 days (Patient taking differently: Take 150 mg by mouth daily)     sildenafil (VIAGRA) 25 MG tablet Take 1 tablet (25 mg) by mouth 3 times daily     Treprostinil (TYVASO DPI MAINTENANCE KIT) 64 MCG POWD Inhale 64 mcg into the lungs 4 times daily     vitamin (B COMPLEX) capsule Take 1 capsule by mouth daily     Vitamin D, Cholecalciferol, 10 MCG (400 UNIT) TABS Take 1 capsule by mouth daily     No current facility-administered medications for this visit.       ROS:   10 point ROS negative except as discussed in above HPI.    EXAM:  /69 (BP Location: Left arm, Patient Position: Sitting, Cuff Size: Adult Regular)   Pulse 77   SpO2 100%   General: appears comfortable, alert and articulate, sitting in a wheelchair  Head: normocephalic, atraumatic  Eyes: anicteric sclera, EOMI  Heart: regular, normal S1/S2, no murmur, gallop, rub, estimated JVP 6 cm, 1+ carotid and radial pulses  Lungs: diffuse crackles and diminished breath sounds, no wheezing, on supplemental oxygen  Abdomen: soft, non-tender, bowel sounds present, no hepatosplenomegaly  Extremities: no clubbing, cyanosis or edema  Neurological: normal speech and affect, no gross motor deficits    Labs:  Recent Results (from the past 24 hour(s))   CBC with platelets    Collection Time: 03/13/23  7:31 AM   Result Value Ref Range    WBC Count 11.4 (H) 4.0 - 11.0 10e3/uL    RBC Count 4.89 4.40 - 5.90 10e6/uL    Hemoglobin 15.7 13.3 - 17.7 g/dL    Hematocrit 45.9 40.0 - 53.0 %    MCV 94 78 - 100 fL    MCH 32.1 26.5 - 33.0 pg    MCHC 34.2 31.5 - 36.5 g/dL    RDW 13.7 10.0 - 15.0 %    Platelet Count 275 150 - 450 10e3/uL   Comprehensive metabolic panel    Collection Time: 03/13/23  7:31 AM   Result Value Ref Range    Sodium 137 136 - 145 mmol/L    Potassium 3.9 3.4 - 5.3 mmol/L    Chloride 101 98 - 107 mmol/L    Carbon Dioxide  (CO2) 25 22 - 29 mmol/L    Anion Gap 11 7 - 15 mmol/L    Urea Nitrogen 20.0 8.0 - 23.0 mg/dL    Creatinine 0.80 0.67 - 1.17 mg/dL    Calcium 9.0 8.8 - 10.2 mg/dL    Glucose 93 70 - 99 mg/dL    Alkaline Phosphatase 88 40 - 129 U/L    AST 35 10 - 50 U/L    ALT 42 10 - 50 U/L    Protein Total 7.1 6.4 - 8.3 g/dL    Albumin 4.1 3.5 - 5.2 g/dL    Bilirubin Total 0.5 <=1.2 mg/dL    GFR Estimate >90 >60 mL/min/1.73m2   N terminal pro BNP outpatient    Collection Time: 03/13/23  7:31 AM   Result Value Ref Range    N Terminal Pro BNP Outpatient 735 0 - 900 pg/mL   Echocardiogram Complete    Collection Time: 03/13/23  8:52 AM   Result Value Ref Range    LVEF  60-65%          Echocardiogram 3/13/23:  Global and regional left ventricular function is normal with an EF of 60-65%.  Left ventricular cavity size is small.  Global right ventricular function is mildly reduced. RV S' 9 cm/s. The  endocardial borders are not visualized clearly enough to perform RVFAC  measurements. Mild right ventricular dilation is present.  Mild TR.  Pulmonary artery systolic pressure cannot be assessed. The PA acceleration  time could not be obtained.  IVC diameter <2.1 cm collapsing >50% with sniff suggests a normal RA pressure  of 3 mmHg.  Trivial pericardial effusion is present. Chamber compression is not present;  there is no evidence for tamponade.  This study was compared with the study from 09/08/2022. No significant changes  noted. The trivial pericardial effusion is newly noted.    PFTs 2/16/23:  FVC: 93%  FEV1: 93%  FEV1/FVC: 77%  Deferred obtaining DLCO as patient could not hold his breath    RHC 9/12/22:  RA: 2  RV: 40/2  PA: 40/15 (25)  PCWP: 9  TD CO/CI: 2.8/1.4  Tod CO/CI: 2.9/1.4  PVR: 5.6    With Dominik 80 ppm:  PA: 39/15 (25)  PCWP 12  TD CO/CI: 2.8/1.4  Tod CO/CI: 3.8/1.9  PVR: 4.6    6MWT 6/16/22: Ambulated 140 meters, required O2 to be increased to 10 L/min and lowest saturation was 85% on 10 L/min      Assessment and Plan:     In  summary, Mr. Deo Wilkinson is a very pleasant 67-year-old male with a past medical history significant for combined pulmonary fibrosis and emphysema, who presents for ongoing management of pulmonary hypertension secondary to combined pulmonary fibrosis and emphysema.     He is currently on Tyvaso 64 mcg four times a day without any improvement in symptoms. He has required additional supplemental oxygen and is limited in his activities. He is on 15 liters with exertion and 5-8 liters of supplemental oxygen at rest. We would categorize him as NYHA functional class IIIB.  On most recent echocardiogram in September 2022, his right ventricle was mildly dilated with mild to moderately reduced function. He is not in decompensated RV failure. We discussed obtaining repeat invasive hemodynamic measurements before starting any new therapies, which he does not want to pursue at this time. We will start sildenafil 25 mg three times a day [facility] and repeat right heart catheterization with Dr. Martin, echocardiogram, and labs 3-4 months after starting sildenafil. We advised him to monitor for possible worsening hypoxemia with the initiation of sildenafil. He continues to decline lung transplant evaluation today. His overall prognosis is poor, which he is aware of.    Follow-up with RIGO Aceves 1 month after starting sildenafil.    It was a pleasure seeing Deo Wilkinson at the [facility]pertension [facility]. Please contact us with any questions or concerns that you may have.    Patient was seen and discussed with staff attending, Dr. Martin.    Sincerely,      Nicolasa Kennedy MD, PhD  Cardiology Fellow    I examined the patient and agree with the assessment and plan of Dr. Kennedy    Total time today was 44 minutes reviewing notes, imaging, labs, patient visit, orders and documentation     Veronica Martin MD   Center for Pulmonary Hypertension  Heart Failure, Transplant,  and Mechanical Circulatory Support Cardiology   Cardiovascular Division  [facility]   [phone]

## 2023-03-13 NOTE — NURSING NOTE
Plan as patient understands:  -Start Sildenafil 25mg TID  -follow up virtually 1 month after starting the Sildenafil  -June/July return for RHC w/Dr. Martin with Echo & Labs as well    ========================    Reviewed Med list    Completed AVS    Follow-up orders placed    Sent signed Rx to Sil [facility]    Sent staff msg to [facility] for follow up   Patient verbalized understanding, agreed with plan and denied any further questions. Yasmeen Simeon RN on 3/13/2023 at 11:07 AM

## 2023-03-13 NOTE — PROGRESS NOTES
Deo Wilkinson's goals for this visit include: Following up.     He requests these members of his care team be copied on today's visit information: PCP    PCP: William Schaefer    Referring Provider:  RIGO Aceves  [facility] HEART CARE  [address] [place] [address]    /69 (BP Location: Left arm, Patient Position: Sitting, Cuff Size: Adult Regular)   Pulse 77   SpO2 100%     Do you need any medication refills at today's visit? No.    Abad Ulrich, EMT  Clinic Support  [facility]    [phone]    Employed by [facility] [employer]

## 2023-03-13 NOTE — TELEPHONE ENCOUNTER
----- Message from Yasmeen Simeon RN sent at 3/13/2023 10:59 AM CDT -----  Regarding: New med  Cindi/Angie,    Dr. Martin wants patient to start Sildenafil 25mg TID ([facility] patient)    Dx: PAH oop Lung Diz  FC: 3  I sent you the signed Rx to your e-mail to include with the paperwork.    Angie, patient to have 1 month VV after starting new med.  6 months RHC w/TT, Echo & Labs.    Patient is beginning end stage lung disease, which he anticipates gives him 6-12 months left.    Yasmeen

## 2023-03-16 NOTE — TELEPHONE ENCOUNTER
----- Message from Yasmeen Simeon RN sent at 3/13/2023  3:11 PM CDT -----  Regarding: RE: New med  That is the only tablet size they carry (Formulary).    H.  ----- Message -----  From: Cindi Willams CMA  Sent: 3/13/2023  11:25 AM CDT  To: Yasmeen Simeon RN  Subject: RE: New med                                      H iHeather,   You can disregard the email reply I sent about this - I just saw this message :D   Do you know why it is 25 mg instead of 20 mg though?   Thx  lrr  ----- Message -----  From: Yasmeen Simeon RN  Sent: 3/13/2023  11:03 AM CDT  To: Angie Peters RN, Cindi Willams [facility], #  Subject: New med                                          Cindi/Angie,    Dr. Martin wants patient to start Sildenafil 25mg TID ([facility])    Dx: PAH oop Lung Diz  FC: 3  I sent you the signed Rx to your e-mail to include with the paperwork.    Angie, patient to have 1 month VV after starting new med.  6 months RHC w/TT, Echo & Labs.    Patient is beginning end stage lung disease, which he anticipates gives him 6-12 months left.    Yasmeen

## 2023-03-16 NOTE — TELEPHONE ENCOUNTER
3/16/2023  @ 2:14 PM - Faxed signed rx for sildenafil 25 mg TID, #90 for 30 to [facility] @ [phone] with prog note dated 3/13/23.  Unable to send earlier as prog note was not signed.      PA Initiation  Medication: Sildenafil 25 mg TID - new rx to [facility] (25 mg only strength available)  Insurance Company: Comment:  [facility]- PA TBD by [facility]  Pharmacy Filling the Rx: [facility] - [place], [place] - [address]  Filling Pharmacy Phone:    Filling Pharmacy Fax:    Start Date: 3/16/2023  FAX, SEE ABOVE.            Sil THORNTON CMA- Prior Auths  Cardiology/Pulmonary Hypertension

## 2023-03-20 NOTE — TELEPHONE ENCOUNTER
3/20/2023  @ 9:25 AM -  Jacqueline/ [facility] ([phone] PH) -( 3/17/23 @ 1211pm)  called that they received the rx for sildenafil 25 mg, however, the [facility] prefers to [facility] sildenafil 20 mg TID for patients with PH.  Please send a new prescription to FAX:  [phone].  Routed staff msg to Juancarlos/JULIANNE THORNTON CMA- Prior Auths  Cardiology/Pulmonary Hypertension

## 2023-03-20 NOTE — TELEPHONE ENCOUNTER
3/20/2023  @ 3:45 PM - Tyvaso DPI 64 mcg maint dose - Per [facility], they will reach out to the [facility] for any PA approval requirements.    ----------------------------  Insurance:  [facility] CCN  BIN: *  PCN: *  GRP#: *  ID#: [id number]    PA Initiation  Medication: Tyvaso DPI - maint 64 mcg - renew - exp: 4/3/23 thru [facility] ([facility] will submit PA)  Insurance Company: Comment:  [facility] benefits  Pharmacy Filling the Rx: [facility] - [place], [place] - [address]  Filling Pharmacy Phone:    Filling Pharmacy Fax:    Start Date: 3/20/2023  - per [facility] staff: they will reach out to [facility] for PA submission/approval.  Dx code:  I27.2 (OOP to ILD)    Sil THORNTON CMA- Prior Auths  Cardiology/Pulmonary Hypertension

## 2023-03-20 NOTE — TELEPHONE ENCOUNTER
Date: 3/20/2023    Time of Call: 10:23 AM     Diagnosis:  Pulmonary hypertension     [ VORB ] Ordering provider: Dr. Martin  Order: Okay to change sildenafil from 25mg po TID to 20mg po TID     Order received by: Angie Peters RN       Follow-up/additional notes: Faxed order to [facility]

## 2023-03-20 NOTE — TELEPHONE ENCOUNTER
----- Message from Sil Willams CMA sent at 3/20/2023  9:27 AM CDT -----  Regarding: RE: New med  Hi,   See notes below.  [facility] out that [facility] 20 mg and prefer PH patients dose with sildenafil 20 mg TID (#90/30).  If provider is okay with this dosing, please [facility] a [facility] @  [phone].   Thanks  Sil THORNTON CMA    ---------------------------------------  Faxed new Rx for Sildenafil 20mg TID.  Yasmeen Simeon RN on 3/20/2023 at 2:15 PM    Routed to DRE Adams

## 2023-03-23 NOTE — TELEPHONE ENCOUNTER
furosemide (LASIX) 40 MG tablet      Last Written Prescription Date:  ?  Last Fill Quantity: ?,   # refills: ?  Last Office Visit : 3/13/23  Future Office visit:  4/13/23    Routing refill request to provider for review/approval because:  Medication is reported/historical

## 2023-03-23 NOTE — TELEPHONE ENCOUNTER
Called patient to follow-up on Sildenafil start. [facility]. Patient will call pharmacy to order and update nurse with any issues and or start date.     Angie Peters RN on 3/23/2023 at 10:44 AM

## 2023-03-23 NOTE — TELEPHONE ENCOUNTER
Health Call Center    Phone Message    May a detailed message be left on voicemail: yes     Reason for Call: Medication Refill Request    Has the patient contacted the pharmacy for the refill? Yes   Name of medication being requested: furosemide (LASIX) 40 MG tablet  Provider who prescribed the medication: Dr Martin  Pharmacy: [facility] - [place] [id number] [facility]    Date medication is needed: 3/27/2023         Action Taken: Other: Cardiology    Travel Screening: Not Applicable     Thank you!  Specialty Access Center

## 2023-03-30 NOTE — TELEPHONE ENCOUNTER
Patient received sildenafil 20mg TID and is tolerating well. Discussed to monitor BP daily for upcoming appointment. Patient verbalized understanding.     Angie Peters RN on 3/30/2023 at 1:02 PM

## 2023-04-05 NOTE — TELEPHONE ENCOUNTER
[facility] Call Center    Phone Message    May a detailed message be left on voicemail: yes     Reason for Call: Other: Pt called in stating his viagra medication is giving him headaches, please call pt back for further discussion at [phone]     Action Taken: Other: cardiology    Travel Screening: Not Applicable     Thank you!  Specialty Access Center

## 2023-04-06 NOTE — TELEPHONE ENCOUNTER
Called patient back and left direct number to follow-up on s/s.    Angie Peters RN on 4/6/2023 at 1:47 PM

## 2023-04-07 NOTE — TELEPHONE ENCOUNTER
Patient is having sever headaches with sildenafil. He currently does use tylenol with the Tyvaso and is using 2000-3000mg daily. Reports the headaches are not tolerable and he has stopped the medication. Discussed nurse would update Dr. odonnell regarding this for further suggestions.   Angie Peters RN on 4/7/2023 at 3:00 PM

## 2023-04-10 NOTE — TELEPHONE ENCOUNTER
Dr. Martin is okay with stopping Sildenafil dt worsening HA. VORB orders for RHC in June/July with Dr. Martin. Patient called to update this plan and orders entered        Angie Peters RN on 4/10/2023 at 9:30 AM

## 2023-07-07 NOTE — TELEPHONE ENCOUNTER
Wife answered. I advised I will reach out again in an hour to review instructions. Barbara Vivas RN on 7/7/2023 at 10:17 AM    Reviewed pre-procedure instructions with patient and wife; verbalized understanding and did not have any further questions or concerns. Barbara Vivas RN on 7/7/2023 at 12:07 PM       Pre-procedure instructions - Right heart catheterization  Patient Education    1. Your arrival time is 12 PM.  Location is [address]  2. Please plan on being at the hospital all day.  3. At any time, emergencies and/or urgent cases may come up which could delay the start of your procedure.    Pre-procedure instructions - Right heart catheterization  - No solid food for 8 hours prior  - Nothing to drink 2 hours prior to arrival time  - You can take your morning medications (except diabetic and blood thinners) with sips of water  - We recommend you arrange for a ride to drop you off and pick you up, in the instance, you are unable to drive home, however you should be able to function as you normally would after the procedure      Patient will review results with Dr. Martin post-procedure on 2A. Barbara Vivas RN on 7/7/2023 at 12:08 PM

## 2023-07-12 NOTE — PROGRESS NOTES
Discharge paperwork reviewed with patient, pt stated understanding. Wife Milagros will transport patient home

## 2023-07-12 NOTE — DISCHARGE INSTRUCTIONS
[facility]                        Interventional Cardiology  Discharge Instructions   Post Right Heart Cath      AFTER YOU GO HOME:  DO drink plenty of fluids  DO resume your regular diet and medications unless otherwise instructed by your Primary Physician  Do Not scrub the procedure site vigorously  No lotion or powder to the puncture site for 3 days    CALL YOUR PRIMARY PHYSICIAN IF: You may resume all normal activity.  Monitor neck site for bleeding, swelling, or voice changes. If you notice bleeding or swelling immediately apply pressure to the site and call number below to speak with Cardiology Fellow.  If you experience any changes in your breathing you should call your doctor immediately or come to the closest Emergency Department.  Do not drive yourself.    ADDITIONAL INSTRUCTIONS: Medications: You are to resume all home medications including anticoagulation therapy unless otherwise advised by your primary cardiologist or nurse coordinator.    Follow Up: Per your primary cardiology team    If you have any questions or concerns regarding your procedure site please call [phone] at anytime and ask for Cardiology Fellow on call.  They are available 24 hours a day.  You may also contact the Cardiology Clinic after hours number at [phone].                                                       Telephone Numbers [phone] Monday-Friday 8:00 am to 4:30 pm    [phone] After 4:30 pm Monday-Friday, Weekends & Holidays  Ask for Interventional Cardiologist on call. Someone is on call 24 hours/day   [facility] toll free number [phone] Monday-Friday 8:00 am to 4:30 pm   [facility] Emergency Dept [phone]

## 2023-07-12 NOTE — PROGRESS NOTES
Service Date: 2023    Dane De La Vega MD  [facility]  [address] [place] [id number]    RE:  Deo Wilkinson   MRN:  [id number]  :  1955      Dear Dr. De La Vega:       We had the pleasure of seeing Mr. Deo Wilkinson in our Pulmonary Hypertension Clinic at the [facility].    With pulmonary hypertension was currently on combination therapy with inhaled treprostinil DPI 64 mcg 4 times a day.  He could not tolerate sildenafil due to headache.  He returns today for follow-up.     His shortness of breath has been getting worse in the last 3 months.  He is getting winded more easily even with minimal exertion.  He continues to require 8 to 10 L of oxygen at rest and 10 to 15 L with exertion.  He has not had any lower extremity swelling or abdominal distention.  No exertional presyncope or syncope.  No exertional chest pain or chest pressure.  He has not had any interim hospitalization or ER visit.  He is consistently taking his inhaled treprostinil DPI however does have cough for 20 minutes following the treatment.  He is not able to tolerate sildenafil due to significant headache.    He was prescribed life 2000 noninvasive ventilator by Dr. De La Vega.  Patient states that he is not able to tolerate it because of his prior nasal surgery.  He is only using supplemental oxygen.    PAST MEDICAL HISTORY:  1.  Coronary artery disease, acute coronary syndrome, status post coronary intervention in .  2.  CPFE.  2.  Pulmonary hypertension.    PAST SURGICAL HISTORY:  Past Surgical History:   Procedure Laterality Date     BACK SURGERY       CARDIAC SURGERY       COLONOSCOPY       CV RIGHT HEART CATH MEASUREMENTS RECORDED N/A 2022     CV RIGHT HEART CATH PULMONARY VASODILATOR STUDY N/A 2022     GI SURGERY       ORTHOPEDIC SURGERY         FAMILY HISTORY:  No family history on file.    SOCIAL HISTORY:  Social History     Socioeconomic History      Marital status:      Spouse name: Not on file     Number of children: Not on file     Years of education: Not on file     Highest education level: Not on file   Occupational History     Not on file   Tobacco Use     Smoking status: Some Days     Packs/day: 0.50     Years: 40.00     Pack years: 20.00     Types: Cigarettes     Last attempt to quit: 2021     Years since quittin.6     Smokeless tobacco: Never   Vaping Use     Vaping Use: Never used   Substance and Sexual Activity     Alcohol use: Yes     Comment: 5-8 beers on weekends     Drug use: Never     Sexual activity: Not on file   Other Topics Concern     Not on file   Social History Narrative     Not on file     Social Determinants of Health     Financial Resource Strain: Not on file   Food Insecurity: Not on file   Transportation Needs: Not on file   Physical Activity: Not on file   Stress: Not on file   Social Connections: Not on file   Intimate Partner Violence: Not on file   Housing Stability: Not on file       CURRENT MEDICATIONS:  No current facility-administered medications for this encounter.       ROS:   10 point ROS negative except as discussed in above HPI.    EXAM:  /75 (BP Location: Right arm, Cuff Size: Adult Regular)   Pulse 82   Temp 97.1  F (36.2  C) (Axillary)   Resp 18   Ht 1.829 m (6')   Wt 78.5 kg (173 lb)   SpO2 92%   BMI 23.46 kg/m    General: appears comfortable, alert and articulate, sitting in a wheelchair  Head: normocephalic, atraumatic  Eyes: anicteric sclera, EOMI  Heart: regular, normal S1/S2, no murmur, gallop, rub, estimated JVP 6 cm, 1+ carotid and radial pulses  Lungs: diffuse crackles and diminished breath sounds, no wheezing, on supplemental oxygen  Abdomen: soft, non-tender, bowel sounds present, no hepatosplenomegaly  Extremities: no clubbing, cyanosis or edema  Neurological: normal speech and affect, no gross motor deficits    Labs:  Recent Results (from the past 24 hour(s))   Basic metabolic  panel    Collection Time: 07/12/23 11:30 AM   Result Value Ref Range    Sodium 138 136 - 145 mmol/L    Potassium 4.1 3.4 - 5.3 mmol/L    Chloride 104 98 - 107 mmol/L    Carbon Dioxide (CO2) 24 22 - 29 mmol/L    Anion Gap 10 7 - 15 mmol/L    Urea Nitrogen 15.1 8.0 - 23.0 mg/dL    Creatinine 0.88 0.67 - 1.17 mg/dL    Calcium 9.0 8.8 - 10.2 mg/dL    Glucose 94 70 - 99 mg/dL    GFR Estimate >90 >60 mL/min/1.73m2   NT probnp inpatient and ED    Collection Time: 07/12/23 11:30 AM   Result Value Ref Range    N terminal Pro BNP Inpatient 389 0 - 900 pg/mL   CBC with platelets and differential    Collection Time: 07/12/23 11:30 AM   Result Value Ref Range    WBC Count 9.4 4.0 - 11.0 10e3/uL    RBC Count 4.79 4.40 - 5.90 10e6/uL    Hemoglobin 15.2 13.3 - 17.7 g/dL    Hematocrit 47.3 40.0 - 53.0 %    MCV 99 78 - 100 fL    MCH 31.7 26.5 - 33.0 pg    MCHC 32.1 31.5 - 36.5 g/dL    RDW 14.6 10.0 - 15.0 %    Platelet Count 281 150 - 450 10e3/uL    % Neutrophils 65 %    % Lymphocytes 22 %    % Monocytes 8 %    % Eosinophils 3 %    % Basophils 1 %    % Immature Granulocytes 1 %    NRBCs per 100 WBC 0 <1 /100    Absolute Neutrophils 6.2 1.6 - 8.3 10e3/uL    Absolute Lymphocytes 2.1 0.8 - 5.3 10e3/uL    Absolute Monocytes 0.8 0.0 - 1.3 10e3/uL    Absolute Eosinophils 0.3 0.0 - 0.7 10e3/uL    Absolute Basophils 0.1 0.0 - 0.2 10e3/uL    Absolute Immature Granulocytes 0.1 <=0.4 10e3/uL    Absolute NRBCs 0.0 10e3/uL         Echocardiogram 3/13/23:  Global and regional left ventricular function is normal with an EF of 60-65%.  Left ventricular cavity size is small.  Global right ventricular function is mildly reduced. RV S' 9 cm/s. The  endocardial borders are not visualized clearly enough to perform RVFAC  measurements. Mild right ventricular dilation is present.  Mild TR.  Pulmonary artery systolic pressure cannot be assessed. The PA acceleration  time could not be obtained.  IVC diameter <2.1 cm collapsing >50% with sniff suggests a  normal RA pressure  of 3 mmHg.  Trivial pericardial effusion is present. Chamber compression is not present;  there is no evidence for tamponade.  This study was compared with the study from 09/08/2022. No significant changes  noted. The trivial pericardial effusion is newly noted.    PFTs 2/16/23:  FVC: 93%  FEV1: 93%  FEV1/FVC: 77%  Deferred obtaining DLCO as patient could not hold his breath    RHC 9/12/22:  RA: 4  RV:  52/10  PA: 59/25 (35)  PCWP: 12  TD CO/CI: 3.1/1.55  Tod CO/CI: 2.9/1.45  PVR: 7.9 YEUNG      6MWT 6/16/22: Ambulated 140 meters, required O2 to be increased to 10 L/min and lowest saturation was 85% on 10 L/min      Assessment and Plan:     In summary, Mr. Deo Wilkinson is a very pleasant 68-year-old male with a past medical history significant for combined pulmonary fibrosis and emphysema, who presents for ongoing management of pulmonary hypertension secondary to combined pulmonary fibrosis and emphysema.     Unfortunately, he is having progression of his underlying pulmonary hypertension due to his CPFE.  Clinically he is more symptomatic.  He is functional class III.  He is still continues to need significant supplemental oxygen of 8 to 10 L at rest and 15 L with exertion.  His repeat echocardiogram shows mild RV dysfunction.  His repeat heart catheterization showed worsening PA pressure and PVR.  His cardiac output continues to remain severely reduced.    He is on maximum tolerated dose of inhaled treprostinil DPI.  He could not tolerate sildenafil.  Unfortunately there is no other pulmonary vasodilator therapy we can offer him at this time in point.  I discussed again with him the option of lung transplantation.  He is very clear that he does not want to pursue this.  He understands a very limited options we have on the limited life expectancy.  He is comfortable with this.    I recommend him to start digoxin 250 mcg daily for RV inotropic support.  He is euvolemic on the current dose of Lasix  which I recommend to continue.  He will continue to use supplemental oxygen 8 to 10 L at rest and 15 L with exertion.    He follows with Dr. De La Vega for his CPAP.  He is not on any treatment.    I have recommended him to return to see us in 6 months.  He will call us in the interim of any further worsening symptoms.    It was a pleasure seeing Deo Wilkinson at the [facility]. Please contact us with any questions or concerns that you may have.    Total time today was 42 minutes reviewing notes, imaging, labs, patient visit, orders and documentation    Sincerely,    Veronica Martin MD   [facility] for Pulmonary Hypertension  Heart Failure, Transplant, and Mechanical Circulatory Support Cardiology   Cardiovascular Division  [facility]   [phone]

## 2023-07-12 NOTE — TELEPHONE ENCOUNTER
Orders placed and staff message sent to clinic coordinators to schedule F/U appt. Barbara Vivas RN on 7/12/2023 at 4:02 PM    Paper Rx for digoxin faxed to [facility] at (f) [phone]. Barbara Vivas RN on 7/13/2023 at 9:04 AM      ----- Message from Angie Peters RN sent at 7/12/2023  3:36 PM CDT -----    ----- Message -----  From: Veronica Martin MD  Sent: 7/12/2023   3:33 PM CDT  To: Cardiology Ph Nurse-    Team,    Unfortunately, he is having progression of his pulmonary hypertension due to interstitial lung disease.  He is on inhaled treprostinil DPI.  He could not tolerate sildenafil.  There is really not many options.  He is now interested in lung transplantation.  He understands the limited options we have.    Plan  1.  Start him on digoxin 250 mcg daily  2.  Continue inhaled treprostinil DPI 64 mcg 4 times daily  3.  Supplemental oxygen 8 to 10 L at rest and 15 L with exertion  4.  Continue furosemide 40 mg daily    Return to see either me or Geneva in 6 months patient to call us in the interim of any further worsening symptoms    Thank you    TT

## 2023-07-12 NOTE — PROGRESS NOTES
Pt arrived to [facility] from home for right hearth Cath . VSS. Denies pain. Waiting on consent  . Lab resulted. Need updated H&P . Allergies reviewed with pt. Appropriately NPO.  Prep completed. Wife Milagros at bedside; will be transporting patient to home

## 2023-07-13 NOTE — LETTER
July 13, 2023    From:  [facility]    To: Mr. Wilkinson  [address] [facility] [address]    Dear Deo Wilkinson,    Thank you for considering the [facility]. Dr. Dane De La Vega referred you to our program for consideration of lung transplant evaluation.     The purpose of this letter is to verify that as we discussed by phone, you have thought about lung transplant and are not interested in pursuing lung transplant at this time.  At your request your referral has been closed. Important information you should know:     If you should change your mind and would like to be seen, you may schedule a visit with a transplant pulmonologist by calling [phone] and asking to speak to your transplant coordinator.    We recommend that you continue to follow up with your referring doctor and primary care doctor in order to manage your health concerns.  Enclosed is a letter from [facility] which describes the services offered to patients by [facility] and the Organ Procurement and Transplantation Network.  Thank you for your interest in our transplant program.  We wish you well.    Sincerely,    [facility]    CC Primary Care Provider        Referring Provider                    The Organ Procurement and Transplantation Network  Toll-free patient services line:     Your resource for organ transplant information    If you have a question regarding your own medical care, you always should call your transplant hospital first. However, for general organ transplant-related information, you can call the Organ Procurement and Transplantation Network (OPTN) toll-free patient services line at 4-766-735- [phone]. Anyone, including potential transplant candidates, candidates, recipients, family members, friends, living donors, and donor family members, can call this number to:          Talk about organ donation, living donation, the transplant process, the  donation process, and transplant policies.    Get a free patient information kit with helpful booklets, waiting list and transplant information, and a list of all transplant hospitals.    Ask questions about the OPTN website (https://optn.transplant.hrsa.gov/), the United Network for Organ Sharing s (UNOS) website (https://unos.org/), or the UNOS website for living donors and transplant recipients. (https://www.transplantliving.org/).    Learn how the OPTN can help you.    Talk about any concerns that you may have with a transplant hospital.    The [facility] transplant system, the OPTN, is managed under federal contract by the United Network for Organ Sharing (UNOS), which is a non-profit charitable organization. The OPTN helps create and define organ sharing policies that make the best use of donated organs. This process continuously evaluating new advances and discoveries so policies can be adapted to best serve patients waiting for transplants. To do so, the OPTN works closely with transplant professionals, transplant patients, transplant candidates, donor families, living donors, and the public. All transplant programs and organ procurement organizations throughout the country are OPTN members and are obligated to follow the policies the OPTN creates for allocating organs.    The OPTN also is responsible for:      Providing educational material for patients, the public, and professionals.    Raising awareness of the need for donated organs and tissue.    Coordinating organ procurement, matching, and placement.    Collecting information about every organ transplant and donation that occurs in the [place].    Remember, you should contact your transplant hospital directly if you have questions or concerns about your own medical care including medical records, work-up progress, and test results.    We are not your transplant hospital, and our staff will not be able to answer questions about your case, so please  keep your transplant hospital s phone number handy.    However, while you research your transplant needs and learn as much as you can about transplantation and donation, we welcome your call to our toll-free patient services line at [phone].          Updated 4/1/2019

## 2023-07-13 NOTE — TELEPHONE ENCOUNTER
Reached Edie via phone. He confirms he is not interested in lung transplant and would like to close his referral. Reviewed that he will receive a letter reflecting referral closure and advised that he can call anytime to reopen his referral should his interest change. He expressed understanding. Referral closed and UNOS letter sent per guidelines.

## 2023-07-13 NOTE — TELEPHONE ENCOUNTER
Date: 7/13/2023    Company: [facility]    Phone Number: [phone]    Representative: Susy    Details: Spoke with Susy who states the prescription was received and the current nintedanib (Ofev) consult is good through 8/12/2023. The [facility] will need new chart notes near the beginning of August.

## 2023-07-14 NOTE — TELEPHONE ENCOUNTER
Lab orders faced to William Schaefer's office at (f) [phone]. Barbara Vivas RN on 7/14/2023 at 9:22 AM

## 2023-07-14 NOTE — TELEPHONE ENCOUNTER
----- Message from Naomy Travis sent at 7/13/2023  3:28 PM CDT -----  How do you get the labs info sent to their lab ? They are asking if you will do that for them   ----- Message -----  From: Barbara Vivas RN  Sent: 7/13/2023  10:28 AM CDT  To: Naomy Travis    Yes, that is definitely okay. Thank you for checking.    Mri  ----- Message -----  From: Naomy Travis  Sent: 7/13/2023  10:25 AM CDT  To: Barbara Vivas RN    They are asking if they can do Labs locally with their primary and then see Geneva via video?   ----- Message -----  From: Barbara Vivas RN  Sent: 7/12/2023   4:04 PM CDT  To: Clinic Coordinators-[facility]-    Team -     Orders are in, could you please schedule the patient for a follow up in 6 months with Geneva (labs prior)?    Thank you!  DRE Hester  ----- Message -----  From: Angie Peters RN  Sent: 7/12/2023   3:36 PM CDT  To: Barbara Vivas RN      ----- Message -----  From: Veronica Martin MD  Sent: 7/12/2023   3:33 PM CDT  To: Cardiology Ph Nurse-    Team,    Unfortunately, he is having progression of his pulmonary hypertension due to interstitial lung disease.  He is on inhaled treprostinil DPI.  He could not tolerate sildenafil.  There is really not many options.  He is now interested in lung transplantation.  He understands the limited options we have.    Plan  1.  Start him on digoxin 250 mcg daily  2.  Continue inhaled treprostinil DPI 64 mcg 4 times daily  3.  Supplemental oxygen 8 to 10 L at rest and 15 L with exertion  4.  Continue furosemide 40 mg daily    Return to see either me or Geneva in 6 months patient to call us in the interim of any further worsening symptoms    Thank you    TT

## 2023-08-07 NOTE — TELEPHONE ENCOUNTER
Date: 8/7/2023    Company: [facility]    Phone Number: [phone]    Representative: Jil    Details: Inquired if new consult notes were received. Jil would only confirm prescription for nintedanib was on file and had refills, shipped to patient in July and no new requests were made. Jil transferred liaison to [facility] (internal transfer - no phone number available).    Spoke with Jolly at [facility] who will fax a Request for Service form over. Request for Service form needs to be filled out as well as sending most recent chart notes to new number ([phone]).

## 2023-08-09 NOTE — TELEPHONE ENCOUNTER
Received fax that Request for Service Form was invalid (needs wet signature from MD). Emailed Vito form for Dr. De La Vega's signature.

## 2023-08-17 NOTE — TELEPHONE ENCOUNTER
PA Initiation    Medication: OFEV 100 MG PO CAPS  Insurance Company: Comment:  [facility]  Pharmacy Filling the Rx: [facility] - [place], [place] - [address]  Filling Pharmacy Phone: [phone]  Filling Pharmacy Fax: [phone]  Start Date: 8/3/2023        Faxed Request for Service form and most recent chart notes to [facility]

## 2023-08-26 NOTE — TELEPHONE ENCOUNTER
Patient Contacted for the patient to call back and schedule the following:    Appointment type: [facility]  Provider: burt  Return date: 01/18/24  Specialty phone number: [phone]  Additional appointment(s) needed: FPFT  Additonal Notes: N/A

## 2023-09-07 NOTE — TELEPHONE ENCOUNTER
Date: 9/7/23    Company: [facility]    Phone Number: [phone]    Representative: Alejandra    Details: Requested an update on Edie's status whether his consult was renewed/approved and that he would be able to get his Ofev refilled. No understanding. Retried several ways of explaining but no luck. Was transferred to the pharmacy. Spoke with Libia. Libia was only able to say prescription was active and just needed to be ordered.    Called and left message for patient to call back in regards to Ofev and ordering from the [facility].

## 2023-09-11 NOTE — TELEPHONE ENCOUNTER
"9/11/2023  @ 6:02 PM - Tyvaso DPI 64 mcg - renew - exp:   9/18/23  (thru [facility])    - sent email to Lisa/Masono - \"This pt's Tyvaso DPI 64 mcg Prior Auth will be expiring with the VA on 9/18/23.   Please let us know if you need anything from us to submit to the [facility] so patient's treatment is not interrupted. \"      See notes from 3/2023:     3/20/2023  @ 3:45 PM - Tyvaso DPI 64 mcg maint dose - Per [facility], they will reach out to the VA for any PA approval requirements.    ----------------------------  Insurance:  [facility]  BIN: *  PCN: *  GRP#: *  ID#: [id number]     PA Initiation  Medication: Tyvaso DPI - maint 64 mcg - renew - exp: 4/3/23 thru [facility] (VA will submit PA)  Insurance Company: Comment:  VA benefits  Pharmacy Filling the Rx: [facility] - [place], [place] - [address]  Filling Pharmacy Phone:    Filling Pharmacy Fax:    Start Date: 3/20/2023  - per [facility] staff: they will reach out to VA for PA submission/approval.  Dx code:  I27.2 (OOP to ILD)     Sil THORNTON CMA- Prior Auths  Cardiology/Pulmonary Hypertension     Sil THORNTON CMA- Prior Auths  Cardiology/Pulmonary Hypertension     "

## 2023-09-18 NOTE — TELEPHONE ENCOUNTER
Prior Authorization Approval    Medication: OFEV 100 MG PO CAPS  Authorization Effective Date: 9/10/2023  Authorization Expiration Date: 9/10/2024  Approved Dose/Quantity: #60 per 30 days  Reference #:     Insurance Company: Comment:  [facility]  Expected CoPay:       CoPay Card Available:      Financial Assistance Needed: Unclear  Which Pharmacy is filling the prescription: [facility] - [place], [place] - [address] [place] [address]  Pharmacy Notified: Yes  Patient Notified: Yes    Sp[facility] with Zoë at [facility] and was told that everything should be complete, just waiting on the patient to order. Patient ordered on 9/10/2023.

## 2023-10-20 NOTE — TELEPHONE ENCOUNTER
"Please see the attached discharge form for Deo Wilkinson,  1955. The patient has reported stopping Tyvaso DPI.     Thanks,        Lisa Rodriguez  ____________________________________________________    Spouse advised patient is on hospice and \"not doing well.\" Recently hospitalized and stopped Tyvaso d/t side effects. Expressed my condolences and asked that they reach out to our office if there is anything we can help with. Barbara Vivas RN on 10/20/2023 at 10:16 AM        "

## (undated) DEVICE — PACK HEART RIGHT CUSTOM SAN32RHF18

## (undated) DEVICE — KIT MANIFOLD NAMIC STANDARD 72IN RIGHT HEART 2 PT 613000213

## (undated) DEVICE — Device

## (undated) DEVICE — INTRO SHEATH 7FRX10CM PINNACLE RSS702

## (undated) DEVICE — KIT MICROINTRODUCER VASCULAR  4FRX21GAX4CM

## (undated) DEVICE — UMMC CONVENIENCE KIT FORMALLY H9656021017160 NEW# 602101716

## (undated) RX ORDER — LIDOCAINE 40 MG/G
CREAM TOPICAL
Status: DISPENSED
Start: 2023-01-01

## (undated) RX ORDER — LIDOCAINE 40 MG/G
CREAM TOPICAL
Status: DISPENSED
Start: 2022-09-12